# Patient Record
Sex: FEMALE | Race: WHITE | NOT HISPANIC OR LATINO | URBAN - METROPOLITAN AREA
[De-identification: names, ages, dates, MRNs, and addresses within clinical notes are randomized per-mention and may not be internally consistent; named-entity substitution may affect disease eponyms.]

---

## 2018-05-01 ENCOUNTER — APPOINTMENT (RX ONLY)
Dept: URBAN - METROPOLITAN AREA CLINIC 23 | Facility: CLINIC | Age: 28
Setting detail: DERMATOLOGY
End: 2018-05-01

## 2018-05-01 DIAGNOSIS — Z41.9 ENCOUNTER FOR PROCEDURE FOR PURPOSES OTHER THAN REMEDYING HEALTH STATE, UNSPECIFIED: ICD-10-CM

## 2018-05-01 DIAGNOSIS — L91.8 OTHER HYPERTROPHIC DISORDERS OF THE SKIN: ICD-10-CM

## 2018-05-01 PROBLEM — D23.5 OTHER BENIGN NEOPLASM OF SKIN OF TRUNK: Status: ACTIVE | Noted: 2018-05-01

## 2018-05-01 PROCEDURE — ? COUNSELING

## 2018-05-01 PROCEDURE — 99213 OFFICE O/P EST LOW 20 MIN: CPT

## 2018-05-01 PROCEDURE — ? FILLERS

## 2018-05-01 PROCEDURE — ? BENIGN DESTRUCTION COSMETIC

## 2018-05-01 ASSESSMENT — LOCATION ZONE DERM: LOCATION ZONE: TRUNK

## 2018-05-01 ASSESSMENT — LOCATION SIMPLE DESCRIPTION DERM: LOCATION SIMPLE: RIGHT UPPER BACK

## 2018-05-01 ASSESSMENT — LOCATION DETAILED DESCRIPTION DERM: LOCATION DETAILED: RIGHT MID-UPPER BACK

## 2018-05-01 NOTE — PROCEDURE: BENIGN DESTRUCTION COSMETIC
Detail Level: Detailed
Anesthesia Type: 1% lidocaine with epinephrine
Anesthesia Volume In Cc: 0.3
Post-Care Instructions: I reviewed with the patient in detail post-care instructions. Patient is to wear sunprotection, and avoid picking at any of the treated lesions. Pt may apply Vaseline to crusted or scabbing areas.
Consent: The patient's consent was obtained including but not limited to risks of crusting, scabbing, blistering, scarring, darker or lighter pigmentary change, recurrence, incomplete removal and infection.

## 2018-05-01 NOTE — HPI: COSMETIC (FILLERS)
Have You Had Fillers Before?: has not had fillers
Additional History: No history of cold sores.  Declines pictures

## 2018-05-01 NOTE — PROCEDURE: FILLERS
Jawline Filler Volume In Cc: 0
Map Statment: See [address] for Complete Details
Expiration Date (Month Year): 08/2019
Anesthesia Volume In Cc: 0.2
Additional Anesthesia Volume In Cc: 6
Lot #: [id number]
Filler: Unice Spatz
Use Map Statement For Sites (Optional): No
Vermilion Lips Filler Volume In Cc: 0.5
Consent: Written consent obtained. Risks include but not limited to bruising, beading, irregular texture, ulceration, infection, allergic reaction, scar formation, incomplete augmentation, temporary nature, procedural pain.
Anesthesia Type: 1% lidocaine without epinephrine
Price (Use Numbers Only, No Special Characters Or $): 0.00
Topical Anesthesia?: BLT cream (benzocaine 20%, lidocaine 10%, tetracaine 10%)
Post-Care Instructions: Patient instructed to apply ice to reduce swelling.
Additional Area 1 Location: perioral fine lines, lateral mouth, marionette lines
Expiration Date (Month Year): 08/31/2020
Detail Level: Zone
Lot #: [id number]
Additional Area 1 Location: upper lip fine lines and brows

## 2018-05-01 NOTE — HPI: SKIN LESION
Has Your Skin Lesion Been Treated?: not been treated
Is This A New Presentation, Or A Follow-Up?: Growth
Which Family Member (Optional)?: Magalitie

## 2018-06-27 ENCOUNTER — APPOINTMENT (RX ONLY)
Dept: URBAN - METROPOLITAN AREA CLINIC 100 | Facility: CLINIC | Age: 28
Setting detail: DERMATOLOGY
End: 2018-06-27

## 2018-06-27 DIAGNOSIS — Z41.9 ENCOUNTER FOR PROCEDURE FOR PURPOSES OTHER THAN REMEDYING HEALTH STATE, UNSPECIFIED: ICD-10-CM

## 2018-06-27 PROCEDURE — ? BOTOX

## 2018-06-27 NOTE — PROCEDURE: BOTOX
Length Of Topical Anesthesia Application (Optional): 15 minutes
Topical Anesthesia?: 3% lidocaine, 6% prilocaine
Price (Use Numbers Only, No Special Characters Or $): 0.00
Additional Area 1 Units: [address] [facility]
Expiration Date (Month Year): 10/2020
Dilution (U/0.1 Cc): 2.5
Anterior Platysmal Bands Units: 0
Additional Area 2 Location: high forehead 2 cm above brows
Detail Level: Zone
Additional Area 4 Units: 4
Lot #: [id number]
Additional Area 1 Location: glabella
Additional Area 3 Location: [facility]
Additional Area 2 Units: 20
Additional Area 6 Location: high forehead
Consent: Written consent obtained. Risks include but not limited to lid/brow ptosis, bruising, swelling, diplopia, temporary effect, incomplete chemical denervation.
Additional Area 5 Location: chin

## 2018-08-13 ENCOUNTER — APPOINTMENT (RX ONLY)
Dept: URBAN - METROPOLITAN AREA CLINIC 23 | Facility: CLINIC | Age: 28
Setting detail: DERMATOLOGY
End: 2018-08-13

## 2018-08-13 DIAGNOSIS — Z41.9 ENCOUNTER FOR PROCEDURE FOR PURPOSES OTHER THAN REMEDYING HEALTH STATE, UNSPECIFIED: ICD-10-CM

## 2018-08-13 PROCEDURE — ? FILLERS

## 2018-08-13 PROCEDURE — ? IN-HOUSE DISPENSING PHARMACY

## 2018-08-13 NOTE — HPI: COSMETIC (FILLERS)
Have You Had Fillers Before?: has had fillers
Additional History: No Hx of cold sores.
When Was Your Last Filler Injection?: 05/01/2018

## 2018-08-13 NOTE — PROCEDURE: FILLERS
Marionette Lines Filler  Volume In Cc: 0
Expiration Date (Month Year): 02/2020
Additional Anesthesia Volume In Cc: 6
Lot #: [id number]
Anesthesia Type: 1% lidocaine without epinephrine
Detail Level: Zone
Filler: Restylane
Price (Use Numbers Only, No Special Characters Or $): 0.00
Lot #: [id number]
Additional Area 1 Location: lateral jawline, lateral cheeks, marionettte lines, lateral mouth
Additional Area 2 Location: cheeks, lateral jawlines, lateral mouth
Expiration Date (Month Year): 01/31/2021
Additional Area 3 Location: lateral eyes, nasal root
Use Map Statement For Sites (Optional): No
Lot #: [id number]
Post-Care Instructions: Patient instructed to apply ice to reduce swelling.
Additional Area 1 Location: nasal labial folds/vermillion boarder
Map Statment: See [address] for Complete Details
Expiration Date (Month Year): 09/30/2020
Anesthesia Volume In Cc: 0.2
Additional Area 5 Location: NLFs,tear trough  and lips
Additional Area 4 Location: cheeks
Additional Area 2 Location: temples using an acannula
Additional Area 1 Location: NLF's, lateral mouth,
Additional Area 5 Volume In Cc: 0.5
Consent: Written consent obtained. Risks include but not limited to bruising, beading, irregular texture, ulceration, infection, allergic reaction, scar formation, incomplete augmentation, temporary nature, procedural pain.

## 2018-08-13 NOTE — PROCEDURE: IN-HOUSE DISPENSING PHARMACY
Product 29 Units Dispensed: 0
Product 13 Unit Type: mg
Product 3 Amount/Unit (Numbers Only): 1
Render Refills If Set To 0: No
Name Of Product 4: Tretinoin 0.05% cream
Product 2 Price/Unit (In Dollars): 0.00
Name Of Product 6: Valtrex
Product 3 Unit Type: tube(s)
Product 3 Application Directions: Wash face, arms and back daily as instructed
Name Of Product 3: Glycolic cleanser 10 %
Product 6 Unit Type: tablets
Product 6 Application Directions: Take one pill in AM and PM x3 days
Product 1 Application Directions: Apply thin layer to entire face at bedtime only.
Name Of Product 1: Hydroquinone 4% / Tretinoin 0.05% / Fluocinolone 0.01%
Product 4 Application Directions: Apply pea size amount to entire face at bedtime only.
Name Of Product 5: Prednisone 10 mg
Detail Level: Zone
Product 2 Unit Type: jar(s)
Product 5 Amount/Unit (Numbers Only): 7
Product 5 Application Directions: Take 2 tablets x 2 days, take one tablet x 2 days, take 1/2 tablet x 2 days
Name Of Product 7: Latisse
Product 6 Amount/Unit (Numbers Only): 6
Name Of Product 2: brightening pads 4%
Product 2 Application Directions: apply to face daily am and pm as indicated

## 2018-10-10 ENCOUNTER — APPOINTMENT (RX ONLY)
Dept: URBAN - METROPOLITAN AREA CLINIC 100 | Facility: CLINIC | Age: 28
Setting detail: DERMATOLOGY
End: 2018-10-10

## 2018-10-10 DIAGNOSIS — Z41.9 ENCOUNTER FOR PROCEDURE FOR PURPOSES OTHER THAN REMEDYING HEALTH STATE, UNSPECIFIED: ICD-10-CM

## 2018-10-10 PROCEDURE — ? BOTOX

## 2018-10-10 NOTE — PROCEDURE: BOTOX
Additional Area 1 Location: chin
Expiration Date (Month Year): 04/2021
Depressor Anguli Oris Units: 0
Additional Area 2 Location: glabella
Additional Area 5 Location: high forehead
Dilution (U/0.1 Cc): 2.5
Additional Area 3 Location: lateral eyes
Forehead Units: [facility]
Additional Area 1 Units: 4
Additional Area 2 Units: 24
Detail Level: Zone
Price (Use Numbers Only, No Special Characters Or $): 0.00
Additional Area 6 Location: Forehead
Consent: Written consent obtained. Risks include but not limited to lid/brow ptosis, bruising, swelling, diplopia, temporary effect, incomplete chemical denervation.
Lot #: [id number]

## 2019-01-09 ENCOUNTER — APPOINTMENT (RX ONLY)
Dept: URBAN - METROPOLITAN AREA CLINIC 100 | Facility: CLINIC | Age: 29
Setting detail: DERMATOLOGY
End: 2019-01-09

## 2019-01-09 DIAGNOSIS — Z41.9 ENCOUNTER FOR PROCEDURE FOR PURPOSES OTHER THAN REMEDYING HEALTH STATE, UNSPECIFIED: ICD-10-CM

## 2019-01-09 PROCEDURE — ? DYSPORT

## 2019-01-09 NOTE — PROCEDURE: DYSPORT
Additional Area 4 Location: lateral brows
Dilution (U/ 0.1cc): 1.5
Forehead Units: 0
Additional Area 6 Location: left brow and glabella
Expiration Date (Month Year): 07/2019
Additional Area 1 Units: [address] [facility]
Detail Level: Detailed
Additional Area 3 Location: glabella
Glabellar Complex Units: 60
Price (Use Numbers Only, No Special Characters Or $): 0.00
Additional Area 2 Location: 3cm high forehead
Lot #: [id number]
Additional Area 5 Location: chin
Consent: Written consent obtained. Risks include but not limited to lid/brow ptosis, bruising, swelling, diplopia, temporary effect, incomplete chemical denervation.
Additional Area 1 Location: lateral eyes

## 2019-03-26 ENCOUNTER — APPOINTMENT (RX ONLY)
Dept: URBAN - METROPOLITAN AREA CLINIC 23 | Facility: CLINIC | Age: 29
Setting detail: DERMATOLOGY
End: 2019-03-26

## 2019-03-26 DIAGNOSIS — Z41.9 ENCOUNTER FOR PROCEDURE FOR PURPOSES OTHER THAN REMEDYING HEALTH STATE, UNSPECIFIED: ICD-10-CM

## 2019-03-26 DIAGNOSIS — D22 MELANOCYTIC NEVI: ICD-10-CM

## 2019-03-26 PROBLEM — D22.39 MELANOCYTIC NEVI OF OTHER PARTS OF FACE: Status: ACTIVE | Noted: 2019-03-26

## 2019-03-26 PROCEDURE — ? IN-HOUSE DISPENSING PHARMACY

## 2019-03-26 PROCEDURE — ? PULSED-DYE LASER

## 2019-03-26 PROCEDURE — ? BOTOX

## 2019-03-26 PROCEDURE — ? BENIGN DESTRUCTION

## 2019-03-26 PROCEDURE — ? FILLERS

## 2019-03-26 PROCEDURE — 17110 DESTRUCTION B9 LES UP TO 14: CPT

## 2019-03-26 ASSESSMENT — LOCATION ZONE DERM: LOCATION ZONE: FACE

## 2019-03-26 ASSESSMENT — LOCATION SIMPLE DESCRIPTION DERM: LOCATION SIMPLE: RIGHT CHEEK

## 2019-03-26 ASSESSMENT — LOCATION DETAILED DESCRIPTION DERM: LOCATION DETAILED: RIGHT LATERAL MALAR CHEEK

## 2019-03-26 NOTE — HPI: COSMETIC (BOTOX)
Have You Had Botox Before?: has had botox
Additional History: Pt refuses numbing and pricing
When Was Your Last Botox Treatment?: 1/9/2019

## 2019-03-26 NOTE — PROCEDURE: PULSED-DYE LASER
Treated Area: small area
Post-Procedure Care: Sunscreen applied. Post care reviewed with patient.
Fluence In J/Cm2 (Optional): 7.00
Delay Time (Ms): [address] [facility]
Delay Time (Ms): 20
Fluence In J/Cm2 (Optional): 10.0
Detail Level: Zone
Delay Time (Ms): 10
Spot Size: 10 mm
Spot Size: 10x3 mm
Location Override: Rt forehead, NLFs
Spot Size: 7 mm
Fluence In J/Cm2 (Optional): 6.50
Post-Care Instructions: I reviewed with the patient in detail post-care instructions. Patient should stay away from the sun and wear sun protection until treated areas are fully healed.
Price (Use Numbers Only, No Special Characters Or $): 0.00
Comments: Complementary
Cryogen Time (Ms): [address] [address]
Pulse Duration: 10 ms
Cryogen Time (Ms): 30
Pulse Duration: 6 ms
Laser Type: Vbeam 595nm
Pulse Duration: 20 ms
Consent: Written consent obtained, risks reviewed including but not limited to crusting, scabbing, blistering, scarring, darker or lighter pigmentary change, incidental hair removal, bruising, and/or incomplete removal.

## 2019-03-26 NOTE — HPI: COSMETIC (FILLERS)
Have You Had Fillers Before?: has had fillers
Additional History: Pt refuses numbing and pricing.
When Was Your Last Filler Injection?: 8/13/2018

## 2019-03-26 NOTE — PROCEDURE: IN-HOUSE DISPENSING PHARMACY
Product 54 Amount/Unit (Numbers Only): 0
Product 2 Price/Unit (In Dollars): 0.00
Product 5 Application Directions: Take one tablet today  with food with full glass of water as indicated in office
Product 24 Unit Type: mg
Name Of Product 3: Tretinoin 0.05% cream
Product 3 Amount/Unit (Numbers Only): [address] [facility]
Product 6 Application Directions: Take one tablet on Wednesday and one tablet on Thursday as indicated
Product 7 Refills: 1
Product 1 Application Directions: Apply thin layer to affected area to the face every night  at bedtime only.
Product 8 Application Directions: Apply to the clean face in the AM and PM daily
Product 4 Application Directions: Take one tablet half hour today prior to procedure as indicated.
Product 6 Unit Type: tablets
Name Of Product 21: Valium 5mg
Name Of Product 19: Diazepam 5mg
Name Of Product 6: Prednisone 10mg
Name Of Product 1: Hydroquinone 4% / Tretinoin 0.05% / Fluocinolone 0.01%
Product 2 Unit Type: jar(s)
Product 11 Application Directions: Apply a thin layer to the acne prone areas in the AM
Name Of Product 8: TNS Essential
Product 7 Unit Type: ml
Product 11 Unit Type: bottle(s)
Detail Level: Zone
Product 6 Amount/Unit (Numbers Only): 2
Send Charges To Patient Encounter: No
Product 3 Unit Type: grams
Product 9 Application Directions: Apply to the acne prone areas in the Am and Pm
Product 7 Application Directions: Apply to lashes at bedtime daily as indicated
Product 2 Application Directions: apply to face daily am and pm as indicated
Name Of Product 4: Valium 5 mg
Name Of Product 5: Prednisone 20 mg
Name Of Product 7: Latisse
Product 7 Amount/Unit (Numbers Only): 5
Product 1 Unit Type: tube(s)
Name Of Product 9: Clearing tone pads
Product 21 Application Directions: Take one tablet 30 minutes prior to procedure
Name Of Product 2: brightening pads 6 %
Product 3 Application Directions: Apply a pea size amount to the entire face at night
Name Of Product 11: Clearing gel

## 2019-03-26 NOTE — PROCEDURE: FILLERS
Brows Filler  Volume In Cc: 0
Include Cannula Size?: 25G
Additional Area 1 Location: lateral mouth, fine lines perioral, marionette lines, lateral cheeks, vermillion lips
Additional Area 1 Location: smile lines, NLFs,
Additional Area 1 Volume In Cc: 1
Include Cannula Information In Note?: No
Include Cannula Length?: 1.5 inch
Additional Area 2 Location: oral commissures.  Restylane diluted with 1% Lidocaine, injected vermillion lips fine lines,
Additional Area 2 Location: Nasalabial, Glabellar fine lines- Complimentary
Additional Area 1 Location: marionette lines,  lateral mouth lines, perioral fine lines
Additional Area 3 Location: lateral cheeks, lateral mouth, lateral Jawline, nasalabial folds
Filler: Restylane
Additional Area 2 Location: vermillion fine lines, nasalabial folds,oral commissure
Detail Level: Zone
Additional Area 3 Location: Glabbellar fine lines, Nasalabial folds, Marionette lines, vermillion lip
Additional Area 4 Location: vermillion lips and tear thoughts
Additional Area 5 Location: medial  Cheeks using cannula
Additional Area 4 Location: Lateral Cheeks
Price (Use Numbers Only, No Special Characters Or $): 0.00
Lot #: [id number]
Lot #: [id number]
Expiration Date (Month Year): 10/31/2021
Expiration Date (Month Year): 08/31/2019
Anesthesia Type: 1% lidocaine without epinephrine
Consent: Written consent obtained. Risks include but not limited to bruising, beading, irregular texture, ulceration, infection, allergic reaction, scar formation, incomplete augmentation, temporary nature, procedural pain.
Lot #: [id number]
Expiration Date (Month Year): 7/31/21
Include Cannula Brand?: DermaSculpt
Post-Care Instructions: Patient instructed to apply ice to reduce swelling.
Map Statment: See [address] for Complete Details
Additional Anesthesia Volume In Cc: 6

## 2019-03-26 NOTE — PROCEDURE: BENIGN DESTRUCTION
Detail Level: Simple
Anesthesia Type: 1% lidocaine with epinephrine
Include Z78.9 (Other Specified Conditions Influencing Health Status) As An Associated Diagnosis?: No
Medical Necessity Information: It is in your best interest to select a reason for this procedure from the list below. All of these items fulfill various CMS LCD requirements except the new and changing color options.
Post-Care Instructions: I reviewed with the patient in detail post-care instructions. Patient is to wear sunprotection, and avoid picking at any of the treated lesions. Pt may apply Vaseline to crusted or scabbing areas.
Render Post-Care Instructions In Note?: yes
Medical Necessity Clause: This procedure was medically necessary because the lesions that were treated were:
Anesthesia Volume In Cc: 0.3
Treatment Number (Will Not Render If 0): 0
Consent: The patient's consent was obtained including but not limited to risks of crusting, scabbing, blistering, scarring, darker or lighter pigmentary change, recurrence, incomplete removal and infection.

## 2019-03-26 NOTE — PROCEDURE: BOTOX
Additional Area 4 Location: chin
Additional Area 4 Units: 0
Additional Area 1 Location: Lateral brows
Detail Level: Zone
Forehead Units: 24
Lot #: [id number]
Expiration Date (Month Year): 12/2020
Consent: Written consent obtained. Risks include but not limited to lid/brow ptosis, bruising, swelling, diplopia, temporary effect, incomplete chemical denervation.
Additional Area 6 Location: [facility].
Price (Use Numbers Only, No Special Characters Or $): 0.00
Dilution (U/0.1 Cc): 2.5
Additional Area 2 Location: high forehead
Additional Area 5 Location: high forehead 1.5cm above brows

## 2019-06-27 ENCOUNTER — APPOINTMENT (RX ONLY)
Dept: URBAN - METROPOLITAN AREA CLINIC 23 | Facility: CLINIC | Age: 29
Setting detail: DERMATOLOGY
End: 2019-06-27

## 2019-06-27 DIAGNOSIS — Z41.9 ENCOUNTER FOR PROCEDURE FOR PURPOSES OTHER THAN REMEDYING HEALTH STATE, UNSPECIFIED: ICD-10-CM

## 2019-06-27 PROCEDURE — ? RECOMMENDATIONS

## 2019-06-27 PROCEDURE — ? BOTOX

## 2019-06-27 PROCEDURE — ? VELASHAPE

## 2019-06-27 PROCEDURE — ? FILLERS

## 2019-06-27 NOTE — PROCEDURE: VELASHAPE
Mode Used: BodyGuardz
Consent: Written consent obtained, risks reviewed including but not limited to crusting, scabbing, blistering, scarring, darker or lighter pigmentary change, and/or incomplete improvement.
Pre-Procedures Photographs: Yes
Treatment Area: back of thighs
Anesthesia Volume In Cc: 0
Detail Level: Zone
Post-Care Instructions: I reviewed with the patient in detail post-care instructions. Patient should stay away from the sun and wear sun protection until treated areas are fully healed.
Anesthesia Type: 1% lidocaine with epinephrine
Pre-Procedure Text: The treatment areas were then cleaned and a coupling gel was applied.
Use Distraction Techniques In Note: No
Treatment Number (Optional): 1
Post-Procedure: Procedure not performed because velashape machine was not operating properly. Andrei Murcia

## 2019-06-27 NOTE — PROCEDURE: RECOMMENDATIONS
Detail Level: Detailed
Recommendations (Free Text): IPL for brown spots on the face \\nRecommend Triluma cream once at night, prior to laser
Recommendation Preamble: The following recommendations were made during the visit:

## 2019-06-27 NOTE — PROCEDURE: BOTOX
Expiration Date (Month Year): 11/2021
Additional Area 1 Units: 10
Additional Area 5 Location: high forehead 1.5cm above brows
Levator Labii Superioris Units: 0
Consent: Written consent obtained. Risks include but not limited to lid/brow ptosis, bruising, swelling, diplopia, temporary effect, incomplete chemical denervation.
Additional Area 3 Location: high forehead 2 cm above brows
Dilution (U/0.1 Cc): 2.5
Forehead Units: 24
Price (Use Numbers Only, No Special Characters Or $): 0.00
Additional Area 2 Location: chin
Additional Area 6 Location: [facility].
Lot #: [id number]
Detail Level: Zone

## 2019-06-27 NOTE — PROCEDURE: FILLERS
[place] Filler  Volume In Cc: 0
Map Statment: See [address] for Complete Details
Include Cannula Brand?: DermaSculpt
Include Cannula Size?: 25G
Include Cannula Information In Note?: No
Include Cannula Length?: 1.5 inch
Additional Area 1 Location: Dorsal nose, tear troughs
Anesthesia Type: 1% lidocaine without epinephrine
Additional Area 1 Location: lateral mouth, fine lines perioral, marionette lines, lateral cheeks, vermillion lips
Additional Area 1 Location: lateral mouth, perioral fine lines, vermillion lips, marionette lines
Additional Area 1 Volume In Cc: 0.5
Additional Area 2 Location: cheeks, jawline, oral commissure
Additional Area 2 Location: Lips, oral commissure, glabellar fine fines
Additional Area 2 Location: Nasalabial, Glabellar fine lines- Complimentary
Additional Area 3 Location: Glabbellar fine lines, Nasalabial folds, Marionette lines, vermillion lip
Additional Anesthesia Volume In Cc: 6
Additional Area 4 Location: Perioral
Additional Area 4 Location: cheeks, lateral jawline, medial cheeks fine lines
Consent: Written consent obtained. Risks include but not limited to bruising, beading, irregular texture, ulceration, infection, allergic reaction, scar formation, incomplete augmentation, temporary nature, procedural pain.
Post-Care Instructions: Patient instructed to apply ice to reduce swelling.
Additional Area 5 Location: Cheeks, vermillion lips, marionette fine lines, glabellar fine lines, lateral mouth
Filler: Restylane
Lot #: [id number]
Lot #: [id number]
Lot #: [id number]
Detail Level: Zone
Expiration Date (Month Year): 09/2021
Expiration Date (Month Year): 2021-10-31
Price (Use Numbers Only, No Special Characters Or $): 0.00
Expiration Date (Month Year): 08/31/2019

## 2019-07-08 ENCOUNTER — APPOINTMENT (RX ONLY)
Dept: URBAN - METROPOLITAN AREA CLINIC 23 | Facility: CLINIC | Age: 29
Setting detail: DERMATOLOGY
End: 2019-07-08

## 2019-07-08 DIAGNOSIS — Z41.9 ENCOUNTER FOR PROCEDURE FOR PURPOSES OTHER THAN REMEDYING HEALTH STATE, UNSPECIFIED: ICD-10-CM

## 2019-07-08 PROCEDURE — ? COOLSCULPTING

## 2019-07-08 PROCEDURE — ? VELASHAPE

## 2019-07-08 NOTE — PROCEDURE: VELASHAPE
Anesthesia Volume In Cc: 0
Mode Used: Skuid
Post-Procedures Photographs: No
Treatment Number (Optional): 2
Anesthesia Type: 1% lidocaine with epinephrine
Post-Procedure: Procedure not performed because velashape machine was not operating properly. Sangeeta Kent
Pre-Procedures Photographs: Yes
Pre-Procedure Text: The treatment areas were then cleaned and a coupling gel was applied.
Treatment Area: back of thighs
Post-Care Instructions: I reviewed with the patient in detail post-care instructions. Patient should stay away from the sun and wear sun protection until treated areas are fully healed.
Consent: Written consent obtained, risks reviewed including but not limited to crusting, scabbing, blistering, scarring, darker or lighter pigmentary change, and/or incomplete improvement.
Detail Level: Zone

## 2019-07-08 NOTE — PROCEDURE: COOLSCULPTING
Device: Coolsculpting
Post Treatment: After treatment, the suction was turned off, and the applicator was removed from the skin.
Time (Minutes - Will Only Render If Nonzero): [address]
Time (Minutes - Will Only Render If Nonzero): 35
Applicator Size: CoolAdvantage Curve
Suction Settings: The suction settings were per protocol.
Consent: Written consent obtained, risks reviewed including, but not limited to, blistering from suction, darker or lighter pigmentary change, bruising, and/or need for multiple treatments.
Applicator Size: [address]
Applicator Size: CoolAdvantage Petite Curve
Applicator Size: CoolAdvantage Petite
Applicator Size: CoolAdvantage Petite Core
Intro: Prior to treatment, the area was cleaned with alcohol and marked out with a marking pen. The gel sheet was then applied uniformly. The applicator was applied to the skin with good contact and suction.
Location 2: lower abdomen (left)
Detail Level: Zone
Location 1: lower abdomen (right)

## 2019-07-17 ENCOUNTER — APPOINTMENT (RX ONLY)
Dept: URBAN - METROPOLITAN AREA CLINIC 23 | Facility: CLINIC | Age: 29
Setting detail: DERMATOLOGY
End: 2019-07-17

## 2019-07-17 DIAGNOSIS — Z41.9 ENCOUNTER FOR PROCEDURE FOR PURPOSES OTHER THAN REMEDYING HEALTH STATE, UNSPECIFIED: ICD-10-CM

## 2019-07-17 PROCEDURE — ? VELASHAPE

## 2019-07-17 NOTE — PROCEDURE: VELASHAPE
Post-Procedure: Procedure not performed because velashape machine was not operating properly. Tia Mast
Treatment Number (Optional): 3
Mode Used: Wingu
Use Distraction Techniques In Note: No
Pre-Procedure Text: The treatment areas were then cleaned and a coupling gel was applied.
Anesthesia Type: 1% lidocaine with epinephrine
Treatment Area: back of thighs
Detail Level: Zone
Post-Care Instructions: I reviewed with the patient in detail post-care instructions. Patient should stay away from the sun and wear sun protection until treated areas are fully healed.
Consent: Written consent obtained, risks reviewed including but not limited to crusting, scabbing, blistering, scarring, darker or lighter pigmentary change, and/or incomplete improvement.
Pre-Procedures Photographs: Yes
Anesthesia Volume In Cc: 0

## 2019-07-24 ENCOUNTER — APPOINTMENT (RX ONLY)
Dept: URBAN - METROPOLITAN AREA CLINIC 23 | Facility: CLINIC | Age: 29
Setting detail: DERMATOLOGY
End: 2019-07-24

## 2019-07-24 DIAGNOSIS — Z41.9 ENCOUNTER FOR PROCEDURE FOR PURPOSES OTHER THAN REMEDYING HEALTH STATE, UNSPECIFIED: ICD-10-CM

## 2019-07-24 PROCEDURE — ? VELASHAPE

## 2019-07-24 NOTE — PROCEDURE: VELASHAPE
Post-Care Instructions: I reviewed with the patient in detail post-care instructions. Patient should stay away from the sun and wear sun protection until treated areas are fully healed.
Detail Level: Zone
Anesthesia Volume In Cc: 0
Pre-Procedure Text: The treatment areas were then cleaned and a coupling gel was applied.
Treatment Area: back of thighs
Anesthesia Type: 1% lidocaine with epinephrine
Post-Procedures Photographs: No
Mode Used: [employer]
Post-Procedure: Procedure not performed because velashape machine was not operating properly. Riri Jorge
Treatment Number (Optional): 4
Consent: Written consent obtained, risks reviewed including but not limited to crusting, scabbing, blistering, scarring, darker or lighter pigmentary change, and/or incomplete improvement.
Pre-Procedures Photographs: Yes

## 2019-08-02 ENCOUNTER — APPOINTMENT (RX ONLY)
Dept: URBAN - METROPOLITAN AREA CLINIC 23 | Facility: CLINIC | Age: 29
Setting detail: DERMATOLOGY
End: 2019-08-02

## 2019-08-02 DIAGNOSIS — Z41.9 ENCOUNTER FOR PROCEDURE FOR PURPOSES OTHER THAN REMEDYING HEALTH STATE, UNSPECIFIED: ICD-10-CM

## 2019-08-02 PROCEDURE — ? COOLSCULPTING

## 2019-08-02 NOTE — PROCEDURE: COOLSCULPTING
Applicator Size: [address]
Applicator Size: CoolAdvantage Petite Core
Suction Settings: The suction settings were per protocol.
Location 1: inner thigh (left)
Device: Coolsculpting
Time (Minutes - Will Only Render If Nonzero): [address]
Applicator Size: CoolAdvantage Petite
Post Treatment: After treatment, the suction was turned off, and the applicator was removed from the skin.
Time (Minutes - Will Only Render If Nonzero): 35
Location 2: inner thigh (right)
Consent: Written consent obtained, risks reviewed including, but not limited to, blistering from suction, darker or lighter pigmentary change, bruising, and/or need for multiple treatments.
Intro: Prior to treatment, the area was cleaned with alcohol and marked out with a marking pen. The gel sheet was then applied uniformly. The applicator was applied to the skin with good contact and suction.
Detail Level: Zone

## 2019-09-25 ENCOUNTER — APPOINTMENT (RX ONLY)
Dept: URBAN - METROPOLITAN AREA CLINIC 100 | Facility: CLINIC | Age: 29
Setting detail: DERMATOLOGY
End: 2019-09-25

## 2019-09-25 DIAGNOSIS — Z41.9 ENCOUNTER FOR PROCEDURE FOR PURPOSES OTHER THAN REMEDYING HEALTH STATE, UNSPECIFIED: ICD-10-CM

## 2019-09-25 PROCEDURE — ? BOTOX

## 2019-09-25 NOTE — PROCEDURE: BOTOX
[place] Units: 0
Lot #: [id number]
Consent: Written consent obtained. Risks include but not limited to lid/brow ptosis, bruising, swelling, diplopia, temporary effect, incomplete chemical denervation.
Additional Area 1 Location: glabella
Price (Use Numbers Only, No Special Characters Or $): 0.00
Additional Area 3 Location: high forehead 2 cm above right eyebrow
Additional Area 2 Location: under nasal root
Additional Area 5 Location: high forehead 1.5cm above brows
Additional Area 2 Units: 6
Dilution (U/0.1 Cc): 2.5
Detail Level: Zone
Additional Area 6 Location: [facility].
Additional Area 1 Units: [address]
Expiration Date (Month Year): 11/2021

## 2020-02-12 ENCOUNTER — APPOINTMENT (RX ONLY)
Dept: URBAN - METROPOLITAN AREA CLINIC 100 | Facility: CLINIC | Age: 30
Setting detail: DERMATOLOGY
End: 2020-02-12

## 2020-02-12 DIAGNOSIS — Z41.9 ENCOUNTER FOR PROCEDURE FOR PURPOSES OTHER THAN REMEDYING HEALTH STATE, UNSPECIFIED: ICD-10-CM

## 2020-02-12 PROCEDURE — ? BOTOX

## 2020-05-13 ENCOUNTER — APPOINTMENT (RX ONLY)
Dept: URBAN - METROPOLITAN AREA CLINIC 23 | Facility: CLINIC | Age: 30
Setting detail: DERMATOLOGY
End: 2020-05-13

## 2020-05-13 ENCOUNTER — RX ONLY (OUTPATIENT)
Age: 30
Setting detail: RX ONLY
End: 2020-05-13

## 2020-05-13 VITALS — TEMPERATURE: 97.9 F

## 2020-05-13 DIAGNOSIS — Z41.9 ENCOUNTER FOR PROCEDURE FOR PURPOSES OTHER THAN REMEDYING HEALTH STATE, UNSPECIFIED: ICD-10-CM

## 2020-05-13 PROCEDURE — ? FILLERS

## 2020-05-13 PROCEDURE — ? BOTOX

## 2020-05-13 RX ORDER — HYDROCORTISONE 0.5% / HYDROQUINONE 4% / TRETINOIN 0.025% 4; .5; .025 G/100G; G/100G; G/100G
QD EMULSION TOPICAL
Qty: 1 | Refills: 0 | Status: ACTIVE

## 2020-05-13 NOTE — PROCEDURE: BOTOX
Show Additional Area 4: Yes
Anterior Platysmal Bands Units: 0
Lot #: [id number]
Additional Area 5 Location: high forehead 1.5cm above brows
Show Ucl Units: No
Nasal Root Units: 2
Consent: Written consent obtained. Risks include but not limited to lid/brow ptosis, bruising, swelling, diplopia, temporary effect, incomplete chemical denervation.
Dilution (U/0.1 Cc): 2.5
Additional Area 3 Location: high forehead 2 cm above brows
Additional Area 1 Location: lat brows
Glabellar Complex Units: 24
Additional Area 6 Location: [facility]ending [facility] to [facility].
Expiration Date (Month Year): 5/22
Detail Level: Zone
Additional Area 2 Location: chin
Price (Use Numbers Only, No Special Characters Or $): 0.00

## 2020-05-13 NOTE — PROCEDURE: FILLERS
Include Cannula Brand?: DermaSculpt
Tear Troughs Filler  Volume In Cc: 0
Include Cannula Information In Note?: No
Additional Area 1 Location: lateral mouth, perioral fine lines, vermillion lips, marionette lines
Additional Anesthesia Volume In Cc: 6
Additional Area 1 Location: cheeks, lateral mouth, nlfs , lips
Include Cannula Size?: 25G
Additional Area 2 Location: Lips, oral commissure, glabellar fine fines
Additional Area 1 Location: lateral mouth, fine lines perioral, marionette lines, lateral cheeks, vermillion lips
Additional Area 1 Volume In Cc: 0.5
Include Cannula Length?: 1.5 inch
Additional Area 2 Location: cheeks, jawline, oral commissure
Additional Area 3 Location: Glabbellar fine lines, Nasalabial folds, Marionette lines, vermillion lip
Additional Area 2 Location: Nasalabial, Glabellar fine lines- Complimentary
Detail Level: Zone
Additional Area 4 Location: Perioral
Filler: Restylane-L
Price (Use Numbers Only, No Special Characters Or $): 0.00
Additional Area 4 Location: lateral jawline, lateral mouth, glabella lines,
Lot #: [id number]
Additional Area 5 Location: Cheeks, vermillion lips, marionette fine lines, glabellar fine lines, lateral mouth
Map Statment: See [address] for Complete Details
Expiration Date (Month Year): 09/2021
Lot #: [id number]
Lot #: [id number]
Consent: Written consent obtained. Risks include but not limited to bruising, beading, irregular texture, ulceration, infection, allergic reaction, scar formation, incomplete augmentation, temporary nature, procedural pain.
Post-Care Instructions: Patient instructed to apply ice to reduce swelling.
Expiration Date (Month Year): 2022-6-30
Anesthesia Type: 1% lidocaine without epinephrine
Expiration Date (Month Year): 08/31/2019

## 2020-06-18 ENCOUNTER — APPOINTMENT (RX ONLY)
Dept: URBAN - METROPOLITAN AREA CLINIC 23 | Facility: CLINIC | Age: 30
Setting detail: DERMATOLOGY
End: 2020-06-18

## 2020-06-18 DIAGNOSIS — Z41.9 ENCOUNTER FOR PROCEDURE FOR PURPOSES OTHER THAN REMEDYING HEALTH STATE, UNSPECIFIED: ICD-10-CM

## 2020-06-18 PROCEDURE — ? VELASHAPE

## 2020-06-18 NOTE — PROCEDURE: VELASHAPE
Pre-Procedures Photographs: Yes
Mode Used: Vovici
Consent: Written consent obtained, risks reviewed including but not limited to crusting, scabbing, blistering, scarring, darker or lighter pigmentary change, and/or incomplete improvement.
Anesthesia Volume In Cc: 0
Post-Care Instructions: I reviewed with the patient in detail post-care instructions. Patient should stay away from the sun and wear sun protection until treated areas are fully healed.
Use Distraction Techniques In Note: No
Anesthesia Type: 1% lidocaine with epinephrine
Detail Level: Zone
Treatment Area: back of thighs
Treatment Number (Optional): 1
Pre-Procedure Text: The treatment areas were then cleaned and a coupling gel was applied.

## 2020-07-01 ENCOUNTER — APPOINTMENT (RX ONLY)
Dept: URBAN - METROPOLITAN AREA CLINIC 23 | Facility: CLINIC | Age: 30
Setting detail: DERMATOLOGY
End: 2020-07-01

## 2020-07-01 DIAGNOSIS — Z41.9 ENCOUNTER FOR PROCEDURE FOR PURPOSES OTHER THAN REMEDYING HEALTH STATE, UNSPECIFIED: ICD-10-CM

## 2020-07-01 PROCEDURE — ? VELASHAPE

## 2020-07-01 NOTE — PROCEDURE: VELASHAPE
Post-Procedures Photographs: No
Treatment Area: back of thighs
Mode Used: [employer]
Pre-Procedure Text: The treatment areas were then cleaned and a coupling gel was applied.
Anesthesia Volume In Cc: 0
Anesthesia Type: 1% lidocaine with epinephrine
Pre-Procedures Photographs: Yes
Consent: Written consent obtained, risks reviewed including but not limited to crusting, scabbing, blistering, scarring, darker or lighter pigmentary change, and/or incomplete improvement.
Post-Care Instructions: I reviewed with the patient in detail post-care instructions. Patient should stay away from the sun and wear sun protection until treated areas are fully healed.
Detail Level: Zone

## 2020-07-22 ENCOUNTER — APPOINTMENT (RX ONLY)
Dept: URBAN - METROPOLITAN AREA CLINIC 23 | Facility: CLINIC | Age: 30
Setting detail: DERMATOLOGY
End: 2020-07-22

## 2020-07-22 DIAGNOSIS — Z41.9 ENCOUNTER FOR PROCEDURE FOR PURPOSES OTHER THAN REMEDYING HEALTH STATE, UNSPECIFIED: ICD-10-CM

## 2020-07-22 PROCEDURE — ? VELASHAPE

## 2020-07-22 NOTE — PROCEDURE: VELASHAPE
Pre-Procedure Text: The treatment areas were then cleaned and a coupling gel was applied.
Anesthesia Volume In Cc: 0
Use Distraction Techniques In Note: No
Treatment Number (Optional): 4
Mode Used: VIOSO
Anesthesia Type: 1% lidocaine with epinephrine
Pre-Procedures Photographs: Yes
Treatment Area: back of thighs
Detail Level: Zone
Consent: Written consent obtained, risks reviewed including but not limited to crusting, scabbing, blistering, scarring, darker or lighter pigmentary change, and/or incomplete improvement.
Post-Care Instructions: I reviewed with the patient in detail post-care instructions. Patient should stay away from the sun and wear sun protection until treated areas are fully healed.

## 2020-08-05 ENCOUNTER — APPOINTMENT (RX ONLY)
Dept: URBAN - METROPOLITAN AREA CLINIC 100 | Facility: CLINIC | Age: 30
Setting detail: DERMATOLOGY
End: 2020-08-05

## 2020-08-05 VITALS — TEMPERATURE: 97.5 F

## 2020-08-05 DIAGNOSIS — Z41.9 ENCOUNTER FOR PROCEDURE FOR PURPOSES OTHER THAN REMEDYING HEALTH STATE, UNSPECIFIED: ICD-10-CM

## 2020-08-05 PROCEDURE — ? BOTOX

## 2020-08-05 NOTE — PROCEDURE: BOTOX
Glabellar Complex Units: 0
Show Depressor Anguli Units: Yes
Show Right And Left Brow Units: No
Additional Area 1 Location: glabella
Additional Area 1 Units: [address] [place] [address]
Expiration Date (Month Year): 4/23
Detail Level: Zone
Additional Area 2 Location: high forehead 2 cm above brows
Price (Use Numbers Only, No Special Characters Or $): 0.00
Additional Area 2 Units: [address]
Consent: Written consent obtained. Risks include but not limited to lid/brow ptosis, bruising, swelling, diplopia, temporary effect, incomplete chemical denervation.
Additional Area 3 Location: lateral eyes
Additional Area 3 Units: [address] [address]
Additional Area 6 Location: [facility].
Lot #: [id number]
Dilution (U/0.1 Cc): 2.5
Additional Area 5 Location: high forehead 1.5cm above brows

## 2020-11-18 ENCOUNTER — APPOINTMENT (RX ONLY)
Dept: URBAN - METROPOLITAN AREA CLINIC 100 | Facility: CLINIC | Age: 30
Setting detail: DERMATOLOGY
End: 2020-11-18

## 2020-11-18 VITALS — TEMPERATURE: 97.2 F

## 2020-11-18 DIAGNOSIS — Z41.9 ENCOUNTER FOR PROCEDURE FOR PURPOSES OTHER THAN REMEDYING HEALTH STATE, UNSPECIFIED: ICD-10-CM

## 2020-11-18 PROCEDURE — ? DYSPORT

## 2020-11-18 PROCEDURE — ? RECOMMENDATIONS

## 2020-11-18 NOTE — PROCEDURE: RECOMMENDATIONS
Recommendations (Free Text): Recommended Sculptra 1 vial to improve appearance of cheeks. Price quote given $900.00 per vial. \\nRecommended microneedling with PRP to improve skin laxity under eyes area.
Recommendation Preamble: The following recommendations were made during the visit:
Detail Level: Detailed

## 2020-11-18 NOTE — PROCEDURE: DYSPORT
Masseter Units: 0
Show Depressor Anguli Units: Yes
Additional Area 1 Units: [address]
Show Right And Left Brow Units: No
Additional Area 3 Units: 6
Additional Area 2 Location: chin
Price (Use Numbers Only, No Special Characters Or $): 0.00
Expiration Date (Month Year): 07/31/21
Additional Area 4 Location: 2cm high forehead
Lot #: [id number]
Additional Area 5 Location: glabella
Detail Level: Simple
Additional Area 2 Units: 4
Additional Area 3 Location: right forehead 2 cm above brows
Consent: Written consent obtained. Risks include but not limited to lid/brow ptosis, bruising, swelling, diplopia, temporary effect, incomplete chemical denervation.
Dilution (U/ 0.1cc): 1.5

## 2020-12-01 ENCOUNTER — APPOINTMENT (RX ONLY)
Dept: URBAN - METROPOLITAN AREA CLINIC 23 | Facility: CLINIC | Age: 30
Setting detail: DERMATOLOGY
End: 2020-12-01

## 2020-12-01 VITALS — TEMPERATURE: 97.2 F

## 2020-12-01 DIAGNOSIS — Z41.9 ENCOUNTER FOR PROCEDURE FOR PURPOSES OTHER THAN REMEDYING HEALTH STATE, UNSPECIFIED: ICD-10-CM

## 2020-12-01 PROCEDURE — ? MICRONEEDLING

## 2020-12-01 PROCEDURE — ? SCULPTRA

## 2020-12-01 NOTE — PROCEDURE: SCULPTRA
Show Fifth Additional Location?: Yes
Left Tear Trough Volume In Cc: 0
Show Right And Left Lateral Face Volume?: No
Anesthesia Volume In Cc: 1
Dilution Method: The Sculptra was diluted with 5 ml of sterile water for a total volume of 9ccs for each vial.
Expiration Date (Month Year): 03/31/23
Injection Technique: The Sculptra was injected to the listed areas after cleansing the skin and providing appropriate anesthesia.
Map Statement: See Attached Map for Complete Details.
Consent: Written consent obtained. Risks include but not limited to bruising, beading, irregular texture, ulceration, infection, allergic reaction, scar formation, incomplete augmentation, temporary nature, procedural pain.
Additional Anesthesia Volume In Cc: 6
Cheeks Volume In Cc: 0.3
Additional Area 1 Location: cheeks
Additional Area 1 Volume In Cc: 4
Post-Care Instructions: Patient instructed to apply ice to reduce swelling.
Detail Level: Detailed
Anesthesia Type: 1% lidocaine without epinephrine
Lot #: [id number]

## 2020-12-01 NOTE — PROCEDURE: MICRONEEDLING
Consent: Written consent obtained, risks reviewed including but not limited to pain, scarring, infection and incomplete improvement. Patient understands the procedure is cosmetic in nature and will require out of pocket payment.
Post-Care Instructions: After the procedure, take precautions agains sun exposure. Do not apply sunscreen for 12 hours after the procedure. Do not apply make-up for 12 hours after the procedure. Avoid alcohol based toners for 10-14 days. After 2-3 days patients can return to their regular skin regimen.
Depth In Mm: 0.1
Detail Level: Zone
Infusions (Optional): PRP
Location #1: under eyes/face
Treatment Number (Optional): 1

## 2020-12-02 ENCOUNTER — RX ONLY (OUTPATIENT)
Age: 30
Setting detail: RX ONLY
End: 2020-12-02

## 2020-12-02 RX ORDER — TRETINOIN 0.5 MG/G
LOTION TOPICAL
Qty: 1 | Refills: 3 | Status: ERX | COMMUNITY
Start: 2020-12-02

## 2020-12-28 ENCOUNTER — APPOINTMENT (RX ONLY)
Dept: URBAN - METROPOLITAN AREA CLINIC 23 | Facility: CLINIC | Age: 30
Setting detail: DERMATOLOGY
End: 2020-12-28

## 2020-12-28 VITALS — TEMPERATURE: 97.3 F

## 2020-12-28 DIAGNOSIS — Z41.9 ENCOUNTER FOR PROCEDURE FOR PURPOSES OTHER THAN REMEDYING HEALTH STATE, UNSPECIFIED: ICD-10-CM

## 2020-12-28 PROCEDURE — ? PICOWAY LASER

## 2020-12-28 PROCEDURE — ? FILLERS

## 2020-12-28 PROCEDURE — ? ADDITIONAL NOTES

## 2020-12-28 PROCEDURE — ? MICRONEEDLING

## 2020-12-28 ASSESSMENT — LOCATION DETAILED DESCRIPTION DERM: LOCATION DETAILED: LEFT CENTRAL MALAR CHEEK

## 2020-12-28 ASSESSMENT — LOCATION ZONE DERM: LOCATION ZONE: FACE

## 2020-12-28 ASSESSMENT — LOCATION SIMPLE DESCRIPTION DERM: LOCATION SIMPLE: LEFT CHEEK

## 2020-12-28 NOTE — PROCEDURE: FILLERS
Additional Area 4 Volume In Cc: 0
Use Map Statement For Sites (Optional): No
Expiration Date (Month Year): 2023-03
Lot #: [id number]
Map Statment: See [address] for Complete Details
Expiration Date (Month Year): 09/2021
Lot #: [id number]
Anesthesia Type: 1% lidocaine without epinephrine
Expiration Date (Month Year): 08/31/2019
Include Cannula Size?: 25G
Consent: Written consent obtained. Risks include but not limited to bruising, beading, irregular texture, ulceration, infection, allergic reaction, scar formation, incomplete augmentation, temporary nature, procedural pain.
Include Cannula Brand?: DermaSculpt
Post-Care Instructions: Patient instructed to apply ice to reduce swelling.
Additional Area 1 Location: lat cheeks,
Additional Area 1 Volume In Cc: 0.2
Include Cannula Length?: 1.5 inch
Additional Area 1 Location: lateral mouth, perioral fine lines, vermillion lips, marionette lines
Additional Area 2 Location: cheeks, jawline, oral commissure
Additional Anesthesia Volume In Cc: 6
Additional Area 2 Location: Lips, oral commissure, glabellar fine fines
Additional Area 1 Location: lateral mouth, fine lines perioral, marionette lines, lateral cheeks, vermillion lips
Additional Area 4 Location: lateral jawline, lateral mouth, glabella lines,
Additional Area 2 Location: Nasalabial, Glabellar fine lines- Complimentary
Additional Area 3 Location: Glabbellar fine lines, Nasalabial folds, Marionette lines, vermillion lip
Detail Level: Zone
Additional Area 4 Location: Perioral
Filler: Sculptra
Additional Area 5 Location: Cheeks, vermillion lips, marionette fine lines, glabellar fine lines, lateral mouth
Lot #: [id number]
Price (Use Numbers Only, No Special Characters Or $): 0.00

## 2020-12-28 NOTE — PROCEDURE: ADDITIONAL NOTES
Detail Level: Detailed
Additional Notes: Platelet rich plasma injected under each eye ( 0.5 cc each under eye).

## 2020-12-28 NOTE — PROCEDURE: MICRONEEDLING
Consent: Written consent obtained, risks reviewed including but not limited to pain, scarring, infection and incomplete improvement. Patient understands the procedure is cosmetic in nature and will require out of pocket payment.
Depth In Mm: 0.1
Treatment Number (Optional): 0
Post-Care Instructions: After the procedure, take precautions agains sun exposure. Do not apply sunscreen for 12 hours after the procedure. Do not apply make-up for 12 hours after the procedure. Avoid alcohol based toners for 10-14 days. After 2-3 days patients can return to their regular skin regimen.
Detail Level: Zone
Infusions (Optional): PRP and under eye treatment

## 2020-12-28 NOTE — PROCEDURE: PICOWAY LASER
Location Override: spot
Post-Care Instructions: I reviewed with the patient in detail post-care instructions. Patient should avoid sun for a minimum of 4 weeks before and after treatment.
Fluence (J/Cm2): 0.6
Fluence (J/Cm2): 2
Handpiece: n/a nm
Detail Level: Detailed
Treatment Number: 0
Post-Procedure Care: Immediate endpoint: perifollicular erythema and edema. Vaseline and ice applied. Post care reviewed with patient.
Pulse Duration: 2.5 ms
Hide Pulse Duration?: No
Wavelength: 532 nm
Anesthesia Type: 1% lidocaine with epinephrine
Spot Size: 4.0 mm
Frequency (Hz): 2hz
Total Pulses: [facility]
Fluence (J/Cm2): 0.7
Consent: Written consent obtained, risks reviewed including but not limited to crusting, scabbing, blistering, scarring, darker or lighter pigmentary change, paradoxical hair regrowth, incomplete removal of hair and infection.
Spot Size: 6.5 mm
Pre-Procedure: Prior to proceeding the treatment areas were cleaned and all present put on their eye protection.
Spot Size: 2.0 mm
Handpiece: Zoom
Total Pulses: 2 passes 725 pulse

## 2021-02-10 ENCOUNTER — APPOINTMENT (RX ONLY)
Dept: URBAN - METROPOLITAN AREA CLINIC 100 | Facility: CLINIC | Age: 31
Setting detail: DERMATOLOGY
End: 2021-02-10

## 2021-02-10 VITALS — TEMPERATURE: 97.5 F

## 2021-02-10 DIAGNOSIS — Z41.9 ENCOUNTER FOR PROCEDURE FOR PURPOSES OTHER THAN REMEDYING HEALTH STATE, UNSPECIFIED: ICD-10-CM

## 2021-02-10 PROCEDURE — ? FILLERS

## 2021-02-10 PROCEDURE — ? PRESCRIPTION

## 2021-02-10 PROCEDURE — ? DYSPORT

## 2021-02-10 RX ORDER — PREDNISONE 10 MG/1
TABLET ORAL
Qty: 1 | Refills: 1 | Status: ERX | COMMUNITY
Start: 2021-02-10

## 2021-02-10 RX ADMIN — PREDNISONE: 10 TABLET ORAL at 00:00

## 2021-02-10 NOTE — PROCEDURE: FILLERS
Lateral Face Filler  Volume In Cc: 0
Post-Care Instructions: Patient instructed to apply ice to reduce swelling.
Include Cannula Information In Note?: No
Include Cannula Brand?: DermaSculpt
Include Cannula Size?: 25G
Additional Area 1 Location: lateral mouth, fine lines perioral, marionette lines, lateral cheeks, vermillion lips
Additional Area 1 Location: cheeks, lateral mouth, nlfs , glabella
Additional Anesthesia Volume In Cc: 6
Include Cannula Length?: 1.5 inch
Additional Area 2 Location: Nasalabial, Glabellar fine lines- Complimentary
Additional Area 2 Location: cheeks, jawline, oral commissure
Additional Area 1 Location: lateral mouth, perioral fine lines, vermillion lips, marionette lines
Detail Level: Zone
Filler: Restylane-L
Additional Area 2 Location: Lips, oral commissure, glabellar fine fines
Additional Area 3 Location: lips
Additional Area 3 Location: Glabbellar fine lines, Nasalabial folds, Marionette lines, vermillion lip
Additional Area 3 Volume In Cc: 0.5
Price (Use Numbers Only, No Special Characters Or $): 0.00
Additional Area 4 Location: lateral jawline, lateral mouth, glabella lines,
Additional Area 4 Location: Perioral
Additional Area 5 Location: Cheeks, vermillion lips, marionette fine lines, glabellar fine lines, lateral mouth
Lot #: [id number]
Lot #: [id number]
Map Statment: See [address] for Complete Details
Expiration Date (Month Year): 08/31/2019
Anesthesia Type: 1% lidocaine without epinephrine
Lot #: [id number]
Expiration Date (Month Year): 2023-03-31
Expiration Date (Month Year): 09/2021
Consent: Written consent obtained. Risks include but not limited to bruising, beading, irregular texture, ulceration, infection, allergic reaction, scar formation, incomplete augmentation, temporary nature, procedural pain.

## 2021-02-10 NOTE — PROCEDURE: DYSPORT
Masseter Units: 0
Show Additional Area 2: Yes
Additional Area 1 Location: high forehead 2 cm above brows
Dilution (U/ 0.1cc): 1.5
Additional Area 1 Units: [address]
Additional Area 3 Units: 10
Consent: Written consent obtained. Risks include but not limited to lid/brow ptosis, bruising, swelling, diplopia, temporary effect, incomplete chemical denervation.
Additional Area 4 Location: 2cm high forehead
Show Right And Left Pupillary Line Units: No
Topical Anesthesia?: 20% benzocaine, 8% lidocaine, 4% tetracaine
Additional Area 2 Location: glabella
Expiration Date (Month Year): 09/30/21
Additional Area 2 Units: [address] [facility]
Price (Use Numbers Only, No Special Characters Or $): 0.00
Detail Level: Simple
Length Of Topical Anesthesia Application (Optional): 15 minutes
Lot #: [id number]
Additional Area 3 Location: chin

## 2021-03-18 ENCOUNTER — APPOINTMENT (RX ONLY)
Dept: URBAN - METROPOLITAN AREA CLINIC 23 | Facility: CLINIC | Age: 31
Setting detail: DERMATOLOGY
End: 2021-03-18

## 2021-03-18 DIAGNOSIS — Z41.9 ENCOUNTER FOR PROCEDURE FOR PURPOSES OTHER THAN REMEDYING HEALTH STATE, UNSPECIFIED: ICD-10-CM

## 2021-03-18 PROCEDURE — ? AQUAGOLD MICRONEEDLING

## 2021-03-18 PROCEDURE — ? VELASHAPE

## 2021-03-18 NOTE — PROCEDURE: AQUAGOLD MICRONEEDLING
Depth In Mm: 0.1
Consent: Written consent obtained, risks reviewed including but not limited to pain, scarring, infection and incomplete improvement. Patient understands the procedure is cosmetic in nature and will require out of pocket payment.
Detail Level: Zone
Location #1: face
Post-Care Instructions: After the procedure, take precautions agains sun exposure. Do not apply sunscreen for 12 hours after the procedure. Do not apply make-up for 12 hours after the procedure. Avoid alcohol based toners for 10-14 days. After 2-3 days patients can return to their regular skin regimen.
Treatment Number (Optional): 1

## 2021-03-18 NOTE — PROCEDURE: VELASHAPE
Post-Procedures Photographs: No
Mode Used: [employer]
Post-Care Instructions: I reviewed with the patient in detail post-care instructions. Patient should stay away from the sun and wear sun protection until treated areas are fully healed.
Anesthesia Volume In Cc: 0
Treatment Area: thighs
Pre-Procedure Text: The treatment areas were then cleaned and a coupling gel was applied.
Anesthesia Type: 1% lidocaine with epinephrine
Pre-Procedures Photographs: Yes
Detail Level: Zone
Consent: Written consent obtained, risks reviewed including but not limited to crusting, scabbing, blistering, scarring, darker or lighter pigmentary change, and/or incomplete improvement.

## 2021-03-31 ENCOUNTER — APPOINTMENT (RX ONLY)
Dept: URBAN - METROPOLITAN AREA CLINIC 23 | Facility: CLINIC | Age: 31
Setting detail: DERMATOLOGY
End: 2021-03-31

## 2021-03-31 DIAGNOSIS — Z41.9 ENCOUNTER FOR PROCEDURE FOR PURPOSES OTHER THAN REMEDYING HEALTH STATE, UNSPECIFIED: ICD-10-CM

## 2021-03-31 PROCEDURE — ? COOLSCULPTING

## 2021-03-31 NOTE — PROCEDURE: COOLSCULPTING
Time (Minutes - Will Only Render If Nonzero): [address] [employer] [address]
Applicator Size: CoolAdvantage Curve
Device: Coolsculpting
Consent: Written consent obtained, risks reviewed including, but not limited to, blistering from suction, darker or lighter pigmentary change, bruising, and/or need for multiple treatments.
Suction Settings: The suction settings were per protocol.
Time (Minutes - Will Only Render If Nonzero): [address] [facility]
Intro: Prior to treatment, the area was cleaned with alcohol and marked out with a marking pen. The gel sheet was then applied uniformly. The applicator was applied to the skin with good contact and suction.
Applicator Size: CoolAdvantage Curve Plus
Time (Minutes - Will Only Render If Nonzero): 35
Post Treatment: After treatment, the suction was turned off, and the applicator was removed from the skin.
Location 1: lower abdomen (left)
Location 2: lower abdomen (right)
Time (Minutes - Will Only Render If Nonzero): 45
Detail Level: Zone

## 2021-05-27 ENCOUNTER — APPOINTMENT (RX ONLY)
Dept: URBAN - METROPOLITAN AREA CLINIC 23 | Facility: CLINIC | Age: 31
Setting detail: DERMATOLOGY
End: 2021-05-27

## 2021-05-27 VITALS — TEMPERATURE: 97.6 F

## 2021-05-27 DIAGNOSIS — Z41.9 ENCOUNTER FOR PROCEDURE FOR PURPOSES OTHER THAN REMEDYING HEALTH STATE, UNSPECIFIED: ICD-10-CM

## 2021-05-27 PROCEDURE — ? DYSPORT

## 2021-05-27 PROCEDURE — ? MICRONEEDLING

## 2021-05-27 NOTE — PROCEDURE: DYSPORT
Show Additional Area 6: Yes
Levator Labii Superioris Units: 0
Show Ucl Units: No
Additional Area 3 Location: high forehead 3 cm above brows
Dilution (U/ 0.1cc): 1.5
Additional Area 5 Location: glabella
Lot #: [id number]
Price (Use Numbers Only, No Special Characters Or $): 0.00
Detail Level: Simple
Additional Area 2 Location: crows feet
Expiration Date (Month Year): 09/30/20
Additional Area 4 Location: 2cm high forehead
Additional Area 1 Units: 10
Glabellar Complex Units: [address]
Consent: Written consent obtained. Risks include but not limited to lid/brow ptosis, bruising, swelling, diplopia, temporary effect, incomplete chemical denervation.
Additional Area 1 Location: lat brows

## 2021-05-27 NOTE — PROCEDURE: MICRONEEDLING
Detail Level: Zone
Depth In Mm: 0.1
Post-Care Instructions: After the procedure, take precautions agains sun exposure. Do not apply sunscreen for 12 hours after the procedure. Do not apply make-up for 12 hours after the procedure. Avoid alcohol based toners for 10-14 days. After 2-3 days patients can return to their regular skin regimen.
Location #1: face
Treatment Number (Optional): 2
Consent: Written consent obtained, risks reviewed including but not limited to pain, scarring, infection and incomplete improvement. Patient understands the procedure is cosmetic in nature and will require out of pocket payment.

## 2021-07-15 ENCOUNTER — APPOINTMENT (RX ONLY)
Dept: URBAN - METROPOLITAN AREA CLINIC 23 | Facility: CLINIC | Age: 31
Setting detail: DERMATOLOGY
End: 2021-07-15

## 2021-07-15 DIAGNOSIS — Z41.9 ENCOUNTER FOR PROCEDURE FOR PURPOSES OTHER THAN REMEDYING HEALTH STATE, UNSPECIFIED: ICD-10-CM

## 2021-07-15 PROCEDURE — ? VELASHAPE

## 2021-08-25 ENCOUNTER — APPOINTMENT (RX ONLY)
Dept: URBAN - METROPOLITAN AREA CLINIC 23 | Facility: CLINIC | Age: 31
Setting detail: DERMATOLOGY
End: 2021-08-25

## 2021-08-25 DIAGNOSIS — Z41.9 ENCOUNTER FOR PROCEDURE FOR PURPOSES OTHER THAN REMEDYING HEALTH STATE, UNSPECIFIED: ICD-10-CM

## 2021-08-25 PROCEDURE — ? ADDITIONAL NOTES

## 2021-08-25 PROCEDURE — ? MICRONEEDLING

## 2021-08-25 PROCEDURE — ? DYSPORT

## 2021-08-25 NOTE — PROCEDURE: DYSPORT
Show Lcl Units: No
Show Additional Area 2: Yes
Lot #: [id number]
Lateral Platysmal Bands Units: 0
Additional Area 5 Location: glabella
Price (Use Numbers Only, No Special Characters Or $): 0.00
Additional Area 1 Location: high forehead
Glabellar Complex Units: [address]
Additional Area 3 Location: high forehead 3 cm above brows
Dilution (U/ 0.1cc): 1.5
Additional Area 4 Location: 2cm high forehead
Consent: Written consent obtained. Risks include but not limited to lid/brow ptosis, bruising, swelling, diplopia, temporary effect, incomplete chemical denervation.
Detail Level: Simple
Forehead Units: [address]
Expiration Date (Month Year): 05/2022
Additional Area 2 Location: crows feet
Attending with

## 2021-08-25 NOTE — PROCEDURE: MICRONEEDLING
Depth In Mm: 0.1
Post-Care Instructions: After the procedure, take precautions agains sun exposure. Do not apply sunscreen for 12 hours after the procedure. Do not apply make-up for 12 hours after the procedure. Avoid alcohol based toners for 10-14 days. After 2-3 days patients can return to their regular skin regimen.
Detail Level: Zone
Consent: Written consent obtained, risks reviewed including but not limited to pain, scarring, infection and incomplete improvement. Patient understands the procedure is cosmetic in nature and will require out of pocket payment.
Location #1: Face
Treatment Number (Optional): 0

## 2021-08-25 NOTE — PROCEDURE: ADDITIONAL NOTES
Additional Notes: Also injected to tear trough
Detail Level: Detailed
Render Risk Assessment In Note?: no

## 2021-12-13 ENCOUNTER — APPOINTMENT (RX ONLY)
Dept: URBAN - METROPOLITAN AREA CLINIC 23 | Facility: CLINIC | Age: 31
Setting detail: DERMATOLOGY
End: 2021-12-13

## 2021-12-13 DIAGNOSIS — Z41.9 ENCOUNTER FOR PROCEDURE FOR PURPOSES OTHER THAN REMEDYING HEALTH STATE, UNSPECIFIED: ICD-10-CM

## 2021-12-13 PROCEDURE — ? DYSPORT

## 2021-12-13 PROCEDURE — ? MICRONEEDLING

## 2021-12-13 NOTE — PROCEDURE: DYSPORT
[facility] Units: 0
Show Right And Left Pupillary Line Units: No
Consent: Written consent obtained. Risks include but not limited to lid/brow ptosis, bruising, swelling, diplopia, temporary effect, incomplete chemical denervation.
Show Additional Area 1: Yes
Additional Area 3 Location: high forehead 3 cm above brows
Additional Area 1 Location: high forehead
Dilution (U/ 0.1cc): 1.5
Forehead Units: [address] [address]
Glabellar Complex Units: 27
Detail Level: Simple
Price (Use Numbers Only, No Special Characters Or $): 0.00
Additional Area 2 Location: crows feet
Additional Area 4 Location: 2cm high forehead
Expiration Date (Month Year): 06/30/22
Lot #: [id number]
Additional Area 5 Location: glabella

## 2021-12-13 NOTE — PROCEDURE: MICRONEEDLING
Treatment Number (Optional): 0
Depth In Mm: 0.1
Detail Level: Zone
Post-Care Instructions: After the procedure, take precautions agains sun exposure. Do not apply sunscreen for 12 hours after the procedure. Do not apply make-up for 12 hours after the procedure. Avoid alcohol based toners for 10-14 days. After 2-3 days patients can return to their regular skin regimen.
Depth In Mm: 0.6
Infusions (Optional): PRP
Consent: Written consent obtained, risks reviewed including but not limited to pain, scarring, infection and incomplete improvement. Patient understands the procedure is cosmetic in nature and will require out of pocket payment.
Location #1: tear trough

## 2022-02-28 ENCOUNTER — APPOINTMENT (RX ONLY)
Dept: URBAN - METROPOLITAN AREA CLINIC 23 | Facility: CLINIC | Age: 32
Setting detail: DERMATOLOGY
End: 2022-02-28

## 2022-02-28 DIAGNOSIS — Z41.9 ENCOUNTER FOR PROCEDURE FOR PURPOSES OTHER THAN REMEDYING HEALTH STATE, UNSPECIFIED: ICD-10-CM

## 2022-02-28 PROCEDURE — ? FILLERS

## 2022-02-28 NOTE — PROCEDURE: FILLERS
Vermilion Lips Filler Volume In Cc: 0
Expiration Date (Month Year): 2024-03
Lot #: [id number]
Lot #: [id number]
Include Cannula Information In Note?: No
Consent: Written consent obtained. Risks include but not limited to bruising, beading, irregular texture, ulceration, infection, allergic reaction, scar formation, incomplete augmentation, temporary nature, procedural pain.
Expiration Date (Month Year): 08/31/2019
Map Statment: See [address] for Complete Details
Post-Care Instructions: Patient instructed to apply ice to reduce swelling.
Anesthesia Type: 1% lidocaine without epinephrine
Include Cannula Brand?: DermaSculpt
Lot #: [id number]
Expiration Date (Month Year): 2024-02
Include Cannula Size?: 25G
Additional Area 1 Location: lateral mouth, fine lines perioral, marionette lines, lateral cheeks, vermillion lips
Additional Area 1 Location: vermilion lips and oral commesure
Additional Anesthesia Volume In Cc: 6
Additional Area 2 Location: Nasalabial, Glabellar fine lines- Complimentary
Include Cannula Length?: 1.5 inch
Additional Area 2 Location: cheeks, jawline, oral commissure
Additional Area 1 Location: lateral mouth, perioral fine lines, vermillion lips, marionette lines
Filler: Sculptra
Detail Level: Zone
Additional Area 2 Location: Lips, oral commissure, glabellar fine fines
Additional Area 3 Location: Glabbellar fine lines, Nasalabial folds, Marionette lines, vermillion lip
Price (Use Numbers Only, No Special Characters Or $): 0.00
Additional Area 4 Location: lateral jawline, lateral mouth, glabella lines,
Cheeks Filler Volume In Cc: 0.8
Additional Area 5 Location: Cheeks, vermillion lips, marionette fine lines, glabellar fine lines, lateral mouth
Additional Area 4 Location: Perioral

## 2022-09-19 ENCOUNTER — APPOINTMENT (RX ONLY)
Dept: URBAN - METROPOLITAN AREA CLINIC 23 | Facility: CLINIC | Age: 32
Setting detail: DERMATOLOGY
End: 2022-09-19

## 2022-09-19 DIAGNOSIS — Z41.9 ENCOUNTER FOR PROCEDURE FOR PURPOSES OTHER THAN REMEDYING HEALTH STATE, UNSPECIFIED: ICD-10-CM

## 2022-09-19 PROCEDURE — ? DYSPORT

## 2022-09-19 PROCEDURE — ? AQUAGOLD MICRONEEDLING

## 2022-09-19 ASSESSMENT — LOCATION SIMPLE DESCRIPTION DERM
LOCATION SIMPLE: LEFT CHEEK
LOCATION SIMPLE: GLABELLA
LOCATION SIMPLE: LEFT FOREHEAD

## 2022-09-19 ASSESSMENT — LOCATION DETAILED DESCRIPTION DERM
LOCATION DETAILED: LEFT INFERIOR CENTRAL MALAR CHEEK
LOCATION DETAILED: GLABELLA
LOCATION DETAILED: LEFT MEDIAL FOREHEAD

## 2022-09-19 ASSESSMENT — LOCATION ZONE DERM: LOCATION ZONE: FACE

## 2022-09-19 NOTE — PROCEDURE: AQUAGOLD MICRONEEDLING
Depth In Mm: 0.1
Treatment Number (Optional): 1
Post-Care Instructions: After the procedure, take precautions agains sun exposure. Do not apply sunscreen for 12 hours after the procedure. Do not apply make-up for 12 hours after the procedure. Avoid alcohol based toners for 10-14 days. After 2-3 days patients can return to their regular skin regimen.
Location #1: full face
Detail Level: Zone
Price (Use Numbers Only, No Special Characters Or $): [address]
Depth In Mm: 0.6
Consent: Written consent obtained, risks reviewed including but not limited to pain, scarring, infection and incomplete improvement. Patient understands the procedure is cosmetic in nature and will require out of pocket payment.

## 2022-09-19 NOTE — PROCEDURE: DYSPORT
Show Nasal Units: Yes
Detail Level: Simple
R Brow Units: 0
Glabellar Complex Units: [address] [address]
Additional Area 1 Location: high forehead
Price (Use Numbers Only, No Special Characters Or $): 0.00
Show Right And Left Brow Units: No
Additional Area 5 Location: glabella
Expiration Date (Month Year): 2022-08
Additional Area 4 Location: 2cm high forehead
Dilution (U/ 0.1cc): 1.5
Show Inventory Tab: Show
Lot #: [id number]
Consent: Written consent obtained. Risks include but not limited to lid/brow ptosis, bruising, swelling, diplopia, temporary effect, incomplete chemical denervation.
Additional Area 2 Location: crows feet
Additional Area 1 Units: [address] [facility]

## 2022-10-17 ENCOUNTER — APPOINTMENT (RX ONLY)
Dept: URBAN - METROPOLITAN AREA CLINIC 23 | Facility: CLINIC | Age: 32
Setting detail: DERMATOLOGY
End: 2022-10-17

## 2022-10-17 DIAGNOSIS — Z41.9 ENCOUNTER FOR PROCEDURE FOR PURPOSES OTHER THAN REMEDYING HEALTH STATE, UNSPECIFIED: ICD-10-CM

## 2022-10-17 PROCEDURE — ? RECOMMENDATIONS

## 2022-10-17 PROCEDURE — ? PICOWAY LASER

## 2022-10-17 NOTE — PROCEDURE: PICOWAY LASER
Consent: Written consent obtained, risks reviewed including but not limited to crusting, scabbing, blistering, scarring, darker or lighter pigmentary change, paradoxical hair regrowth, incomplete removal of hair and infection.
Treatment Number: 2
Price (Use Numbers Only, No Special Characters Or $): 700.00
Handpiece: Zoom
Total Pulses: 2 passes 725 pulse
Wavelength: 1064 nm
Frequency (Hz): Allie Willson
Spot Size: 2.0 mm
Spot Size: 6.5 mm
Fluence (J/Cm2): 1.9
Post-Procedure Care: Immediate endpoint: perifollicular erythema and edema. Vaseline and ice applied. Post care reviewed with patient.
Pulse Duration: 2.5 ms
Spot Size: 4.0 mm
Wavelength: 532 nm
Pre-Procedure: Prior to proceeding the treatment areas were cleaned and all present put on their eye protection.
Treatment Number: 0
Anesthesia Type: 1% lidocaine with epinephrine
Frequency (Hz): 6 Hz
Post-Care Instructions: I reviewed with the patient in detail post-care instructions. Patient should avoid sun for a minimum of 4 weeks before and after treatment.
Handpiece: Resolve
Spot Size: 6.0 mm
Detail Level: Detailed
Fluence (J/Cm2): 0.6
Hide Pulse Duration?: No

## 2022-11-09 ENCOUNTER — APPOINTMENT (RX ONLY)
Dept: URBAN - METROPOLITAN AREA CLINIC 23 | Facility: CLINIC | Age: 32
Setting detail: DERMATOLOGY
End: 2022-11-09

## 2022-11-09 DIAGNOSIS — Z41.9 ENCOUNTER FOR PROCEDURE FOR PURPOSES OTHER THAN REMEDYING HEALTH STATE, UNSPECIFIED: ICD-10-CM

## 2022-11-09 PROCEDURE — ? CHEMICAL PEEL

## 2022-11-09 NOTE — PROCEDURE: CHEMICAL PEEL
Consent: Prior to the procedure, written consent was obtained and risks were reviewed, including but not limited to: redness, peeling, blistering, pigmentary change, scarring, infection, and pain.
Post Peel Care: After the procedure, a post-peel cream was applied to the treated areas. Post-care instructions were reviewed with the patient.  
Prep: The treated area was degreased with pre-peel cleanser, and vaseline was applied for protection of mucous membranes.
Price (Use Numbers Only, No Special Characters Or $): Tracey 865
Expiration Date (Optional): 
Chemical Peel: Skin [employer]
Detail Level: Zone
Lot Number (Optional): [id number]
Treatment Number: 1
Post-Care Instructions: I reviewed with the patient in detail post-care instructions. Patient should avoid sun exposure and wear sun protection.
Treatment Time (Optional): 2-3 minutes
Number Of Layers: 4

## 2022-12-12 ENCOUNTER — APPOINTMENT (RX ONLY)
Dept: URBAN - METROPOLITAN AREA CLINIC 23 | Facility: CLINIC | Age: 32
Setting detail: DERMATOLOGY
End: 2022-12-12

## 2022-12-12 DIAGNOSIS — Z41.9 ENCOUNTER FOR PROCEDURE FOR PURPOSES OTHER THAN REMEDYING HEALTH STATE, UNSPECIFIED: ICD-10-CM

## 2022-12-12 PROCEDURE — ? DYSPORT

## 2022-12-12 NOTE — PROCEDURE: DYSPORT
Show Additional Area 3: Yes
Inferior Lateral Orbicularis Oculi Units: 0
Additional Area 1 Units: 10
Forehead Units: [address]
Additional Area 2 Location: crows feet
Glabellar Complex Units: 48
Detail Level: Simple
Consent: Written consent obtained. Risks include but not limited to lid/brow ptosis, bruising, swelling, diplopia, temporary effect, incomplete chemical denervation.
Show Ucl Units: No
Lot #: [id number]
Additional Area 3 Location: glabella
Show Inventory Tab: Show
Dilution (U/ 0.1cc): 1.5
Additional Area 4 Location: 2cm high forehead
Expiration Date (Month Year): 2022-08
Periorbital Skin Units: [address] [facility]
Additional Area 1 Location: chin
Price (Use Numbers Only, No Special Characters Or $): 0.00

## 2023-02-22 ENCOUNTER — APPOINTMENT (RX ONLY)
Dept: URBAN - METROPOLITAN AREA CLINIC 100 | Facility: CLINIC | Age: 33
Setting detail: DERMATOLOGY
End: 2023-02-22

## 2023-02-22 DIAGNOSIS — Z41.9 ENCOUNTER FOR PROCEDURE FOR PURPOSES OTHER THAN REMEDYING HEALTH STATE, UNSPECIFIED: ICD-10-CM

## 2023-02-22 PROCEDURE — ? DYSPORT

## 2023-02-22 PROCEDURE — ? FILLERS

## 2023-02-22 NOTE — PROCEDURE: DYSPORT
Show Right And Left Brow Units: No
Show Depressor Anguli Units: Yes
Additional Area 1 Location: lat brows
Masseter Units: 0
Price (Use Numbers Only, No Special Characters Or $): 0.00
Additional Area 1 Units: 10
Additional Area 2 Location: crows feet
Consent: Written consent obtained. Risks include but not limited to lid/brow ptosis, bruising, swelling, diplopia, temporary effect, incomplete chemical denervation.
Lot #: [id number]
Additional Area 3 Location: glabella
Glabellar Complex Units: [address]
Dilution (U/ 0.1cc): 1.5
Detail Level: Simple
Additional Area 4 Location: 2cm high forehead
Expiration Date (Month Year): 2022-08
Show Inventory Tab: Show

## 2023-02-22 NOTE — PROCEDURE: FILLERS
Nasolabial Folds Filler Volume In Cc: 0
Include Cannula Information In Note?: No
Number Of Syringes (Required For Inventory): 1
Consent: Written consent obtained. Risks include but not limited to bruising, beading, irregular texture, ulceration, infection, allergic reaction, scar formation, incomplete augmentation, temporary nature, procedural pain.
Post-Care Instructions: Patient instructed to apply ice to reduce swelling.
Map Statment: See [address] for Complete Details
Anesthesia Type: 1% lidocaine with epinephrine
Anesthesia Volume In Cc: 0.5
Additional Anesthesia Volume In Cc: 6
Inventory Information: This plan will send filler information to inventory based on the fillers you select. Multiple fillers can be sent but you must ensure you select the appropriate fillers in the inventory tab.
Additional Area 1 Location: Temples and lat cheeks
Detail Level: Detailed
Additional Area 1 Volume In Cc: 8
Show Inventory Tab: Show
Filler: Sculptra
Additional Area 1 Location: lateral mouth, perioral fine lines, marionette lines.

## 2023-03-02 NOTE — PROCEDURE: VELASHAPE
Anesthesia Type: 1% lidocaine with epinephrine
Please call patient and let her know she has osteopenia.  Please have her follow-up with Patsy Keene.  Thank you MELISSA Cortez
Pre-Procedures Photographs: Yes
Post-Care Instructions: I reviewed with the patient in detail post-care instructions. Patient should stay away from the sun and wear sun protection until treated areas are fully healed.
Anesthesia Volume In Cc: 0
Post-Procedures Photographs: No
Treatment Area: thighs
Consent: Written consent obtained, risks reviewed including but not limited to crusting, scabbing, blistering, scarring, darker or lighter pigmentary change, and/or incomplete improvement.
Mode Used: [employer]
Detail Level: Zone
Pre-Procedure Text: The treatment areas were then cleaned and a coupling gel was applied.

## 2023-04-19 ENCOUNTER — APPOINTMENT (RX ONLY)
Dept: URBAN - METROPOLITAN AREA CLINIC 23 | Facility: CLINIC | Age: 33
Setting detail: DERMATOLOGY
End: 2023-04-19

## 2023-04-19 DIAGNOSIS — Z41.9 ENCOUNTER FOR PROCEDURE FOR PURPOSES OTHER THAN REMEDYING HEALTH STATE, UNSPECIFIED: ICD-10-CM

## 2023-04-19 PROCEDURE — ? MICRONEEDLING

## 2023-04-19 PROCEDURE — ? INVENTORY

## 2023-04-19 NOTE — PROCEDURE: MICRONEEDLING
Detail Level: Zone
Depth In Mm: 0.1
Infusions (Optional): PRP
Depth In Mm: 1
Consent: Written consent obtained, risks reviewed including but not limited to pain, scarring, infection and incomplete improvement. Patient understands the procedure is cosmetic in nature and will require out of pocket payment.
Price (Use Numbers Only, No Special Characters Or $): [address]
Treatment Number (Optional): 0
Location #1: face

## 2023-06-07 ENCOUNTER — APPOINTMENT (RX ONLY)
Dept: URBAN - METROPOLITAN AREA CLINIC 98 | Facility: CLINIC | Age: 33
Setting detail: DERMATOLOGY
End: 2023-06-07

## 2023-06-07 DIAGNOSIS — Z41.9 ENCOUNTER FOR PROCEDURE FOR PURPOSES OTHER THAN REMEDYING HEALTH STATE, UNSPECIFIED: ICD-10-CM

## 2023-06-07 PROCEDURE — ? DYSPORT

## 2023-06-07 NOTE — PROCEDURE: DYSPORT
Masseter Units: 0
Additional Area 3 Location: high forehead
Show Anterior Platysmal Band Units: Yes
Show Right And Left Periorbital Units: No
Dilution (U/ 0.1cc): 1.5
Additional Area 3 Units: [address] [address]
Additional Area 4 Location: lateral eyes
Additional Area 4 Units: [address] [facility]
Expiration Date (Month Year): 2022-08
Price (Use Numbers Only, No Special Characters Or $): 0.00
Additional Area 5 Location: chin
Additional Area 6 Location: eli brizuela
Additional Area 5 Units: 4
Detail Level: Simple
Additional Area 1 Location: glabella
Additional Area 2 Location: lateral brows
Additional Area 6 Units: 6
Additional Area 1 Units: [address]
Additional Area 2 Units: 10
Show Inventory Tab: Show
Lot #: [id number]
Consent: Written consent obtained. Risks include but not limited to lid/brow ptosis, bruising, swelling, diplopia, temporary effect, incomplete chemical denervation.

## 2023-08-09 ENCOUNTER — APPOINTMENT (RX ONLY)
Dept: URBAN - METROPOLITAN AREA CLINIC 23 | Facility: CLINIC | Age: 33
Setting detail: DERMATOLOGY
End: 2023-08-09

## 2023-08-09 DIAGNOSIS — Z41.9 ENCOUNTER FOR PROCEDURE FOR PURPOSES OTHER THAN REMEDYING HEALTH STATE, UNSPECIFIED: ICD-10-CM

## 2023-08-09 PROCEDURE — ? MICRONEEDLING

## 2023-08-09 PROCEDURE — ? HYDRAFACIAL

## 2023-08-09 PROCEDURE — ? INVENTORY

## 2023-08-09 NOTE — PROCEDURE: HYDRAFACIAL
Tip: Hydropeel Tip, Teal
Tip: Hydropeel Tip, Clear
Procedure: Peel
Solution: Beta-HD
Vacuum Pressure High Setting (Will Not Render If Set To 0): 0
Solution: Antiox-6
Location Override: scalp
Tip Override
Treatment Number: 1
Consent: Written consent obtained, risks reviewed including but not limited to crusting, scabbing, blistering, scarring, darker or lighter pigmentary change, bruising, and/or incomplete response.
Price (Use Numbers Only, No Special Characters Or $): Tracey 583
Solution Override
Procedure: Extraction
Tip: Hydropeel Tip, Blue
Solution: GlySal 7.5%
Post-Care Instructions: I reviewed with the patient in detail post-care instructions. Patient should stay away from the sun and wear sun protection until treated areas are fully healed.
Indication: skin texture
Procedure: Exfoliation
Procedure: Fusion
Location: face
Tip Override: yellow

## 2023-08-09 NOTE — PROCEDURE: MICRONEEDLING
Depth In Mm: 0.1
Treatment Number (Optional): 0
Detail Level: Zone
Depth In Mm: 1
Location #1: face
Consent: Written consent obtained, risks reviewed including but not limited to pain, scarring, infection and incomplete improvement. Patient understands the procedure is cosmetic in nature and will require out of pocket payment.
Price (Use Numbers Only, No Special Characters Or $): Kellee
Infusions (Optional): PRP

## 2023-10-11 ENCOUNTER — APPOINTMENT (RX ONLY)
Dept: URBAN - METROPOLITAN AREA CLINIC 98 | Facility: CLINIC | Age: 33
Setting detail: DERMATOLOGY
End: 2023-10-11

## 2023-10-11 DIAGNOSIS — Z41.9 ENCOUNTER FOR PROCEDURE FOR PURPOSES OTHER THAN REMEDYING HEALTH STATE, UNSPECIFIED: ICD-10-CM

## 2023-10-11 DIAGNOSIS — B07.8 OTHER VIRAL WARTS: ICD-10-CM

## 2023-10-11 PROCEDURE — ? DYSPORT

## 2023-10-11 PROCEDURE — 17110 DESTRUCTION B9 LES UP TO 14: CPT

## 2023-10-11 PROCEDURE — ? LIQUID NITROGEN

## 2023-10-11 ASSESSMENT — LOCATION SIMPLE DESCRIPTION DERM
LOCATION SIMPLE: LEFT THIGH
LOCATION SIMPLE: RIGHT THIGH

## 2023-10-11 ASSESSMENT — LOCATION DETAILED DESCRIPTION DERM
LOCATION DETAILED: LEFT ANTERIOR PROXIMAL THIGH
LOCATION DETAILED: RIGHT ANTERIOR PROXIMAL THIGH

## 2023-10-11 ASSESSMENT — LOCATION ZONE DERM: LOCATION ZONE: LEG

## 2023-10-11 NOTE — PROCEDURE: DYSPORT
Additional Area 4 Units: 0
Show Inventory Tab: Show
Additional Area 5 Location: glabella
Show Additional Area 6: Yes
Show Ucl Units: No
Consent: Written consent obtained. Risks include but not limited to lid/brow ptosis, bruising, swelling, diplopia, temporary effect, incomplete chemical denervation.
Additional Area 1 Location: lat brows
Price (Use Numbers Only, No Special Characters Or $): 0.00
Periorbital Skin Units: [address]
Additional Area 1 Units: 10
Additional Area 2 Location: crows feet
Lot #: [id number]
Forehead Units: [address] [facility]
Dilution (U/ 0.1cc): 1.5
Detail Level: Simple
Additional Area 4 Location: 2cm high forehead
Glabellar Complex Units: [address]
Expiration Date (Month Year): 2022-08

## 2023-10-11 NOTE — PROCEDURE: LIQUID NITROGEN
Include Z78.9 (Other Specified Conditions Influencing Health Status) As An Associated Diagnosis?: No
Show Applicator Variable?: Yes
Medical Necessity Clause: This procedure was medically necessary because the lesions that were treated were:
Detail Level: Simple
Consent: The patient's consent was obtained including but not limited to risks of crusting, scabbing, blistering, scarring, darker or lighter pigmentary change, recurrence, incomplete removal and infection.
Duration Of Freeze Thaw-Cycle (Seconds): 5-10
Medical Necessity Information: It is in your best interest to select a reason for this procedure from the list below. All of these items fulfill various CMS LCD requirements except the new and changing color options.
Spray Paint Text: The liquid nitrogen was applied to the skin utilizing a spray paint frosting technique.
Number Of Freeze-Thaw Cycles: 2 freeze-thaw cycles
Post-Care Instructions: I reviewed with the patient in detail post-care instructions. Patient is to wear sunprotection, and avoid picking at any of the treated lesions. Pt may apply Vaseline to crusted or scabbing areas.

## 2024-02-14 ENCOUNTER — APPOINTMENT (RX ONLY)
Dept: URBAN - METROPOLITAN AREA CLINIC 98 | Facility: CLINIC | Age: 34
Setting detail: DERMATOLOGY
End: 2024-02-14

## 2024-02-14 DIAGNOSIS — Z41.9 ENCOUNTER FOR PROCEDURE FOR PURPOSES OTHER THAN REMEDYING HEALTH STATE, UNSPECIFIED: ICD-10-CM

## 2024-02-14 PROCEDURE — ? DYSPORT

## 2024-02-14 PROCEDURE — ? RECOMMENDATIONS

## 2024-02-14 ASSESSMENT — LOCATION DETAILED DESCRIPTION DERM
LOCATION DETAILED: RIGHT MEDIAL FRONTAL SCALP
LOCATION DETAILED: GLABELLA
LOCATION DETAILED: LEFT SUPERIOR LATERAL MALAR CHEEK

## 2024-02-14 ASSESSMENT — LOCATION SIMPLE DESCRIPTION DERM
LOCATION SIMPLE: RIGHT SCALP
LOCATION SIMPLE: LEFT CHEEK
LOCATION SIMPLE: GLABELLA

## 2024-02-14 ASSESSMENT — LOCATION ZONE DERM
LOCATION ZONE: SCALP
LOCATION ZONE: FACE

## 2024-02-14 NOTE — PROCEDURE: DYSPORT
Additional Area 3 Location: glabella
Masseter Units: 0
Show Topical Anesthesia: Yes
Dilution (U/0.1 Cc): 1.5
Periorbital Skin Units: 60
Additional Area 4 Location: 2cm high forehead
Show Ucl Units: No
Expiration Date (Month Year): 2022-08
Price (Use Numbers Only, No Special Characters Or $): 0.00
Additional Area 1 Location: lat brows
Glabellar Complex Units: 50
Additional Area 1 Units: 10
Detail Level: Simple
Additional Area 2 Location: crows feet
Lot #: [id number]
Consent: Written consent obtained. Risks include but not limited to lid/brow ptosis, bruising, swelling, diplopia, temporary effect, incomplete chemical denervation.
Show Inventory Tab: Show

## 2024-02-14 NOTE — PROCEDURE: RECOMMENDATIONS
Render Risk Assessment In Note?: no
Recommendation Preamble: The following recommendations were made during the visit:
Recommendations (Free Text): Full face Frax x 3 treatment
Detail Level: Detailed

## 2024-02-22 ENCOUNTER — APPOINTMENT (RX ONLY)
Dept: URBAN - METROPOLITAN AREA CLINIC 98 | Facility: CLINIC | Age: 34
Setting detail: DERMATOLOGY
End: 2024-02-22

## 2024-02-22 DIAGNOSIS — Z41.9 ENCOUNTER FOR PROCEDURE FOR PURPOSES OTHER THAN REMEDYING HEALTH STATE, UNSPECIFIED: ICD-10-CM

## 2024-02-22 PROCEDURE — ? DYSPORT

## 2024-02-22 NOTE — PROCEDURE: DYSPORT
Show Depressor Anguli Units: Yes
Show Inventory Tab: Show
Left Periorbital Skin Units: 0
Additional Area 6 Location: neck bands
Consent: Written consent obtained. Risks include but not limited to lid/brow ptosis, bruising, swelling, diplopia, temporary effect, incomplete chemical denervation.
Show Right And Left Pupillary Line Units: No
Additional Area 1 Location: Right brow
Post-Care Instructions: Patient instructed to not lie down for 4 hours, limit physical activity for 24 hours and avoid manipulation of the treated areas.
Additional Area 4 Location: Neckbands
Expiration Date (Month Year): 2022-10
Lot #: [id number]
Additional Area 2 Location: chin
Dilution (U/0.1 Cc): 1.5
Detail Level: Zone
Additional Area 5 Location: upper lip
Additional Area 3 Location: forehead NC
Additional Comments: L brow 5 units

## 2024-03-21 ENCOUNTER — APPOINTMENT (RX ONLY)
Dept: URBAN - METROPOLITAN AREA CLINIC 23 | Facility: CLINIC | Age: 34
Setting detail: DERMATOLOGY
End: 2024-03-21

## 2024-03-21 DIAGNOSIS — Z41.9 ENCOUNTER FOR PROCEDURE FOR PURPOSES OTHER THAN REMEDYING HEALTH STATE, UNSPECIFIED: ICD-10-CM

## 2024-03-21 PROCEDURE — ? CANDELA NORDLYS

## 2024-03-21 NOTE — PROCEDURE: CANDELA NORDLYS
Passes (Will Not Render If Zero): 0
Price (Use Numbers Only, No Special Characters Or $): 1500
Consent: Written consent obtained, risks reviewed including but not limited to crusting, scabbing, blistering, scarring, darker or lighter pigmentary change, and/or incomplete removal.
Location: full face
Post-Care Instructions: I reviewed with the patient in detail post-care instructions.
Wavelength (Optional): 1550 nm
Modality: Frax
Energy In Mj (Optional): 38
Treatment Number (Will Not Render If Zero): 1
Hsv Prophylaxis: no
Hsv Prophylaxis Prescription: Valtrex 500mg BID starting the day of procedures and continuing until all sites healed
Eye Shielding Text (Leave Blank If Unwanted- Will Be Inserted If Selecting Eye Shields): The intraocular eye shields were placed. 2 drops of intraocular tetracaine HCL ophthalmic 0.5% solution was administered. The eye shields were coated with ophthalmic bacitracin prior to insertion. After the shields were removed the eyes were flushed with normal saline.
Wavelength (Optional): 1940 nm
Energy In Mj (Optional): 15.2
% Coverage: 25
% Coverage: 30
Detail Level: Zone

## 2024-04-03 ENCOUNTER — RX ONLY (OUTPATIENT)
Age: 34
Setting detail: RX ONLY
End: 2024-04-03

## 2024-04-03 RX ORDER — OXYMETAZOLINE HYDROCHLORIDE OPHTHALMIC 1 MG/ML
SOLUTION/ DROPS OPHTHALMIC
Qty: 45 | Refills: 6 | Status: ERX | COMMUNITY
Start: 2024-04-03

## 2024-05-08 ENCOUNTER — APPOINTMENT (RX ONLY)
Dept: URBAN - METROPOLITAN AREA CLINIC 98 | Facility: CLINIC | Age: 34
Setting detail: DERMATOLOGY
End: 2024-05-08

## 2024-05-08 DIAGNOSIS — Z41.9 ENCOUNTER FOR PROCEDURE FOR PURPOSES OTHER THAN REMEDYING HEALTH STATE, UNSPECIFIED: ICD-10-CM

## 2024-05-08 PROCEDURE — ? DYSPORT

## 2024-05-08 NOTE — PROCEDURE: DYSPORT
Mentalis Units: 0
Show Right And Left Periorbital Units: No
Consent: Written consent obtained. Risks include but not limited to lid/brow ptosis, bruising, swelling, diplopia, temporary effect, incomplete chemical denervation.
Periorbital Skin Units: 40
Show Additional Area 6: Yes
Detail Level: Simple
Additional Area 4 Location: 2cm high forehead
Dilution (U/0.1 Cc): 1.5
Show Inventory Tab: Show
Expiration Date (Month Year): 2022-08
Additional Area 5 Location: glabella
Glabellar Complex Units: 50
Additional Area 1 Location: lat brows
Additional Area 2 Location: crows feet
Additional Area 1 Units: 10
Price (Use Numbers Only, No Special Characters Or $): 0.00
Forehead Units: 20
Lot #: [id number]

## 2024-05-09 ENCOUNTER — APPOINTMENT (RX ONLY)
Dept: URBAN - METROPOLITAN AREA CLINIC 23 | Facility: CLINIC | Age: 34
Setting detail: DERMATOLOGY
End: 2024-05-09

## 2024-05-09 DIAGNOSIS — Z41.9 ENCOUNTER FOR PROCEDURE FOR PURPOSES OTHER THAN REMEDYING HEALTH STATE, UNSPECIFIED: ICD-10-CM

## 2024-05-09 DIAGNOSIS — L72.8 OTHER FOLLICULAR CYSTS OF THE SKIN AND SUBCUTANEOUS TISSUE: ICD-10-CM

## 2024-05-09 PROCEDURE — ? CANDELA NORDLYS

## 2024-05-09 PROCEDURE — ? INTRALESIONAL KENALOG

## 2024-05-09 PROCEDURE — 11900 INJECT SKIN LESIONS </W 7: CPT

## 2024-05-09 PROCEDURE — ? COUNSELING

## 2024-05-09 ASSESSMENT — LOCATION SIMPLE DESCRIPTION DERM: LOCATION SIMPLE: RIGHT INNER THIGH

## 2024-05-09 ASSESSMENT — LOCATION DETAILED DESCRIPTION DERM: LOCATION DETAILED: RIGHT MEDIAL POSTERIOR THIGH

## 2024-05-09 ASSESSMENT — LOCATION ZONE DERM: LOCATION ZONE: LEG

## 2024-05-09 NOTE — PROCEDURE: CANDELA NORDLYS
Location: full face
Pulse Time (Will Not Render If Zero): 0
Hsv Prophylaxis: no
Hsv Prophylaxis Prescription: Valtrex 500mg BID starting the day of procedures and continuing until all sites healed
Modality: Frax
Wavelength (Optional): 1940 nm
% Coverage: 30
Eye Shielding Text (Leave Blank If Unwanted- Will Be Inserted If Selecting Eye Shields): The intraocular eye shields were placed. 2 drops of intraocular tetracaine HCL ophthalmic 0.5% solution was administered. The eye shields were coated with ophthalmic bacitracin prior to insertion. After the shields were removed the eyes were flushed with normal saline.
Post-Care Instructions: I reviewed with the patient in detail post-care instructions.
Energy In Mj (Optional): 10.0
Consent: Written consent obtained, risks reviewed including but not limited to crusting, scabbing, blistering, scarring, darker or lighter pigmentary change, and/or incomplete removal.
Detail Level: Zone
Energy In Mj (Optional): 30.0
Price (Use Numbers Only, No Special Characters Or $): 1500
Wavelength (Optional): 1550 nm
Treatment Number (Will Not Render If Zero): 1

## 2024-05-09 NOTE — PROCEDURE: INTRALESIONAL KENALOG
Require Ndc Code?: No
X Size Of Lesion In Cm (Optional): 0
Concentration Of Kenalog Solution Injected (Mg/Ml): 3.0
Expiration Date For Kenalog (Optional): Dec.2025
Show Inventory Tab: Hide
Kenalog Preparation: Kenalog
Ndc# For Kenalog Only: 7219-8300-97
Medical Necessity Clause: This procedure was medically necessary because the lesions that were treated were:
Detail Level: Detailed
Validate Note Data When Using Inventory: Yes
Total Volume (Ccs): .4
Consent: The risks of atrophy were reviewed with the patient.
Administered By (Optional): Dr. Rosalina Nolasco
Kenalog Type Of Vial: Multiple Dose
Lot # For Kenalog (Optional): [id number]

## 2024-06-12 NOTE — PROCEDURE: BOTOX
Show Additional Area 3: Yes
Nasal Root Units: 0
Dilution (U/0.1 Cc): 2.5
Show Right And Left Periorbital Units: No
Additional Area 5 Location: high forehead 1.5cm above brows
Consent: Written consent obtained. Risks include but not limited to lid/brow ptosis, bruising, swelling, diplopia, temporary effect, incomplete chemical denervation.
Additional Area 3 Units: 6
Adult
Additional Area 3 Location: high forehead 2 cm above brows
Additional Area 1 Location: lateral eyes
Expiration Date (Month Year): 08/2022
Additional Area 4 Location: nasal root
Additional Area 1 Units: [address] [address]
Additional Area 6 Location: [facility].
Lot #: [id number]
Additional Area 4 Units: 4
Detail Level: Zone
Additional Area 2 Units: [address] [facility]
Additional Area 2 Location: glabella
Price (Use Numbers Only, No Special Characters Or $): 0.00

## 2024-07-09 ENCOUNTER — APPOINTMENT (RX ONLY)
Dept: URBAN - METROPOLITAN AREA CLINIC 23 | Facility: CLINIC | Age: 34
Setting detail: DERMATOLOGY
End: 2024-07-09

## 2024-07-09 DIAGNOSIS — Z41.9 ENCOUNTER FOR PROCEDURE FOR PURPOSES OTHER THAN REMEDYING HEALTH STATE, UNSPECIFIED: ICD-10-CM

## 2024-07-09 PROCEDURE — ? INVENTORY

## 2024-07-09 PROCEDURE — ? PRX-T33 BIOREVITALIZATION TREATMENT

## 2024-07-09 NOTE — PROCEDURE: PRX-T33 BIOREVITALIZATION TREATMENT
Chemical Peel: PRX-T33
Consent: Prior to the procedure, consent was obtained and risks were reviewed, including but not limited to: redness, late exfoliation and dark spots which all can take up to a few days to resolve.
Prep: Prior to starting treatment the patient was asked to wash the treatment areas with facial cleanser. The treatment areas were degreased with PRX-T prep solution.
Price (Use Numbers Only, No Special Characters Or $): 400
Number Of Layers: 3
Post-Care Instructions: I reviewed with the patient in detail post-care instructions. Patient should avoid sun exposure and wear sun protection.
Expiration Date (Optional): 04/2025
Treatment Number: 0
Detail Level: Zone
Lot Number (Optional): [id number]
Post Peel Care: After the procedure, the treatment areas were washed and moisturizing cream was applied. Sun protection and post-care instructions were reviewed with the patient.

## 2024-08-02 ENCOUNTER — APPOINTMENT (RX ONLY)
Dept: URBAN - METROPOLITAN AREA CLINIC 23 | Facility: CLINIC | Age: 34
Setting detail: DERMATOLOGY
End: 2024-08-02

## 2024-08-02 DIAGNOSIS — Z41.9 ENCOUNTER FOR PROCEDURE FOR PURPOSES OTHER THAN REMEDYING HEALTH STATE, UNSPECIFIED: ICD-10-CM

## 2024-08-02 PROCEDURE — ? INVENTORY

## 2024-08-02 PROCEDURE — ? DERMAPLANE

## 2024-08-02 PROCEDURE — ? PRX-T33 BIOREVITALIZATION TREATMENT

## 2024-08-02 ASSESSMENT — LOCATION ZONE DERM: LOCATION ZONE: FACE

## 2024-08-02 ASSESSMENT — LOCATION SIMPLE DESCRIPTION DERM: LOCATION SIMPLE: LEFT CHEEK

## 2024-08-02 ASSESSMENT — LOCATION DETAILED DESCRIPTION DERM: LOCATION DETAILED: LEFT INFERIOR CENTRAL MALAR CHEEK

## 2024-08-02 NOTE — PROCEDURE: DERMAPLANE
Detail Level: Zone
Pre-Procedure Text: The patient was placed in a recumbant position on the procedure table.
Treatment Area Prep: alcohol
Post-Care Instructions: I reviewed with the patient in detail post-care instructions.
Treatment Areas: face
Price (Use Numbers Only, No Special Characters Or $): 100
Post-Procedure Instructions: Following the dermaplane procedure, Oxymist treatment was applied to the treatment areas. Moisturizer and SPF was applied.
Blade: 10 blade scalpel

## 2024-08-02 NOTE — PROCEDURE: PRX-T33 BIOREVITALIZATION TREATMENT
Post Peel Care: After the procedure, the treatment areas were washed and moisturizing cream was applied. Sun protection and post-care instructions were reviewed with the patient.
Chemical Peel: PRX-T33
Consent: Prior to the procedure, consent was obtained and risks were reviewed, including but not limited to: redness, late exfoliation and dark spots which all can take up to a few days to resolve.
Price (Use Numbers Only, No Special Characters Or $): 400
Post-Care Instructions: I reviewed with the patient in detail post-care instructions. Patient should avoid sun exposure and wear sun protection.
Lot Number (Optional): [id number]
Detail Level: Zone
Number Of Layers: 3
Treatment Number: 0
Prep: Prior to starting treatment the patient was asked to wash the treatment areas with facial cleanser. The treatment areas were degreased with PRX-T prep solution.
Expiration Date (Optional): 04/2025

## 2024-10-02 ENCOUNTER — RX ONLY (OUTPATIENT)
Age: 34
Setting detail: RX ONLY
End: 2024-10-02

## 2024-10-02 ENCOUNTER — APPOINTMENT (RX ONLY)
Dept: URBAN - METROPOLITAN AREA CLINIC 98 | Facility: CLINIC | Age: 34
Setting detail: DERMATOLOGY
End: 2024-10-02

## 2024-10-02 DIAGNOSIS — Z41.9 ENCOUNTER FOR PROCEDURE FOR PURPOSES OTHER THAN REMEDYING HEALTH STATE, UNSPECIFIED: ICD-10-CM

## 2024-10-02 PROCEDURE — ? DYSPORT

## 2024-10-02 RX ORDER — OXYMETAZOLINE HYDROCHLORIDE OPHTHALMIC 1 MG/ML
SOLUTION/ DROPS OPHTHALMIC
Qty: 45 | Refills: 6 | Status: ERX | COMMUNITY
Start: 2024-10-02

## 2024-10-02 NOTE — PROCEDURE: DYSPORT
Mentalis Units: 0
Show Right And Left Periorbital Units: No
Consent: Written consent obtained. Risks include but not limited to lid/brow ptosis, bruising, swelling, diplopia, temporary effect, incomplete chemical denervation.
Periorbital Skin Units: 40
Show Additional Area 6: Yes
Detail Level: Simple
Additional Area 4 Location: 2cm high forehead
Dilution (U/0.1 Cc): 1.5
Show Inventory Tab: Show
Expiration Date (Month Year): 2022-08
Additional Area 5 Location: glabella
Glabellar Complex Units: 50
Additional Area 1 Location: lat brows
Additional Area 2 Location: crows feet
Additional Area 1 Units: 10
Price (Use Numbers Only, No Special Characters Or $): 0.00
Forehead Units: 20
Lot #: [id number]